# Patient Record
Sex: FEMALE | Race: BLACK OR AFRICAN AMERICAN | NOT HISPANIC OR LATINO | Employment: FULL TIME | URBAN - METROPOLITAN AREA
[De-identification: names, ages, dates, MRNs, and addresses within clinical notes are randomized per-mention and may not be internally consistent; named-entity substitution may affect disease eponyms.]

---

## 2018-02-06 ENCOUNTER — HOSPITAL ENCOUNTER (OUTPATIENT)
Dept: RADIOLOGY | Facility: OTHER | Age: 43
Discharge: HOME | End: 2018-02-06
Attending: OBSTETRICS & GYNECOLOGY
Payer: COMMERCIAL

## 2018-02-06 DIAGNOSIS — Z12.39 BREAST SCREENING: ICD-10-CM

## 2018-02-06 DIAGNOSIS — Z12.39 BREAST SCREENING: Primary | ICD-10-CM

## 2018-02-06 PROCEDURE — 77067 SCR MAMMO BI INCL CAD: CPT | Mod: 26,,, | Performed by: RADIOLOGY

## 2018-02-06 PROCEDURE — 77063 BREAST TOMOSYNTHESIS BI: CPT | Mod: 26,,, | Performed by: RADIOLOGY

## 2018-02-06 PROCEDURE — 77067 SCR MAMMO BI INCL CAD: CPT | Mod: TC

## 2018-02-07 ENCOUNTER — HOSPITAL ENCOUNTER (OUTPATIENT)
Facility: OTHER | Age: 43
Discharge: HOME | End: 2018-02-08
Attending: EMERGENCY MEDICINE | Admitting: HOSPITALIST
Payer: COMMERCIAL

## 2018-02-07 DIAGNOSIS — R10.9 INTRACTABLE ABDOMINAL PAIN: Primary | ICD-10-CM

## 2018-02-07 DIAGNOSIS — K52.9 GASTROENTERITIS, INFECTIOUS, PRESUMED: ICD-10-CM

## 2018-02-07 DIAGNOSIS — R10.84 GENERALIZED ABDOMINAL PAIN: ICD-10-CM

## 2018-02-07 DIAGNOSIS — K52.9 COLITIS: ICD-10-CM

## 2018-02-07 LAB
ALBUMIN SERPL BCP-MCNC: 4.3 G/DL
ALP SERPL-CCNC: 50 U/L
ALT SERPL W/O P-5'-P-CCNC: 13 U/L
ANION GAP SERPL CALC-SCNC: 9 MMOL/L
AST SERPL-CCNC: 13 U/L
BACTERIA #/AREA URNS HPF: ABNORMAL /HPF
BASOPHILS # BLD AUTO: 0 K/UL
BASOPHILS NFR BLD: 0 %
BILIRUB SERPL-MCNC: 0.5 MG/DL
BILIRUB UR QL STRIP: NEGATIVE
BUN SERPL-MCNC: 11 MG/DL
CALCIUM SERPL-MCNC: 9.3 MG/DL
CHLORIDE SERPL-SCNC: 107 MMOL/L
CLARITY UR: ABNORMAL
CO2 SERPL-SCNC: 25 MMOL/L
COLOR UR: YELLOW
CREAT SERPL-MCNC: 1.1 MG/DL
DIFFERENTIAL METHOD: ABNORMAL
EOSINOPHIL # BLD AUTO: 0 K/UL
EOSINOPHIL NFR BLD: 0.1 %
ERYTHROCYTE [DISTWIDTH] IN BLOOD BY AUTOMATED COUNT: 12.7 %
EST. GFR  (AFRICAN AMERICAN): >60 ML/MIN/1.73 M^2
EST. GFR  (NON AFRICAN AMERICAN): >60 ML/MIN/1.73 M^2
GLUCOSE SERPL-MCNC: 153 MG/DL
GLUCOSE UR QL STRIP: NEGATIVE
HCT VFR BLD AUTO: 35.6 %
HGB BLD-MCNC: 11.9 G/DL
HGB UR QL STRIP: ABNORMAL
HYALINE CASTS #/AREA URNS LPF: 0 /LPF
KETONES UR QL STRIP: ABNORMAL
LEUKOCYTE ESTERASE UR QL STRIP: NEGATIVE
LIPASE SERPL-CCNC: 32 U/L
LYMPHOCYTES # BLD AUTO: 1.4 K/UL
LYMPHOCYTES NFR BLD: 8.2 %
MCH RBC QN AUTO: 30.7 PG
MCHC RBC AUTO-ENTMCNC: 33.4 G/DL
MCV RBC AUTO: 92 FL
MICROSCOPIC COMMENT: ABNORMAL
MONOCYTES # BLD AUTO: 0.7 K/UL
MONOCYTES NFR BLD: 4.1 %
NEUTROPHILS # BLD AUTO: 14.5 K/UL
NEUTROPHILS NFR BLD: 87.3 %
NITRITE UR QL STRIP: NEGATIVE
PH UR STRIP: 6 [PH] (ref 5–8)
PLATELET # BLD AUTO: 222 K/UL
PMV BLD AUTO: 10.2 FL
POTASSIUM SERPL-SCNC: 3.8 MMOL/L
PROT SERPL-MCNC: 7.5 G/DL
PROT UR QL STRIP: ABNORMAL
RBC # BLD AUTO: 3.87 M/UL
RBC #/AREA URNS HPF: 4 /HPF (ref 0–4)
SODIUM SERPL-SCNC: 141 MMOL/L
SP GR UR STRIP: >=1.03 (ref 1–1.03)
SQUAMOUS #/AREA URNS HPF: 15 /HPF
URN SPEC COLLECT METH UR: ABNORMAL
UROBILINOGEN UR STRIP-ACNC: NEGATIVE EU/DL
WBC # BLD AUTO: 16.55 K/UL
WBC #/AREA URNS HPF: 0 /HPF (ref 0–5)

## 2018-02-07 PROCEDURE — 81000 URINALYSIS NONAUTO W/SCOPE: CPT

## 2018-02-07 PROCEDURE — 25500020 PHARM REV CODE 255: Performed by: EMERGENCY MEDICINE

## 2018-02-07 PROCEDURE — 96375 TX/PRO/DX INJ NEW DRUG ADDON: CPT

## 2018-02-07 PROCEDURE — 96366 THER/PROPH/DIAG IV INF ADDON: CPT

## 2018-02-07 PROCEDURE — 63600175 PHARM REV CODE 636 W HCPCS: Performed by: PHYSICIAN ASSISTANT

## 2018-02-07 PROCEDURE — 96361 HYDRATE IV INFUSION ADD-ON: CPT

## 2018-02-07 PROCEDURE — 96365 THER/PROPH/DIAG IV INF INIT: CPT

## 2018-02-07 PROCEDURE — 81025 URINE PREGNANCY TEST: CPT | Performed by: PHYSICIAN ASSISTANT

## 2018-02-07 PROCEDURE — 85025 COMPLETE CBC W/AUTO DIFF WBC: CPT

## 2018-02-07 PROCEDURE — 25000003 PHARM REV CODE 250: Performed by: PHYSICIAN ASSISTANT

## 2018-02-07 PROCEDURE — 96376 TX/PRO/DX INJ SAME DRUG ADON: CPT

## 2018-02-07 PROCEDURE — 83690 ASSAY OF LIPASE: CPT

## 2018-02-07 PROCEDURE — 99285 EMERGENCY DEPT VISIT HI MDM: CPT | Mod: 25

## 2018-02-07 PROCEDURE — 96367 TX/PROPH/DG ADDL SEQ IV INF: CPT

## 2018-02-07 PROCEDURE — 80053 COMPREHEN METABOLIC PANEL: CPT

## 2018-02-07 RX ORDER — ASPIRIN 81 MG/1
81 TABLET ORAL DAILY
COMMUNITY

## 2018-02-07 RX ORDER — FENTANYL CITRATE 50 UG/ML
25 INJECTION, SOLUTION INTRAMUSCULAR; INTRAVENOUS
Status: COMPLETED | OUTPATIENT
Start: 2018-02-07 | End: 2018-02-07

## 2018-02-07 RX ORDER — HYOSCYAMINE SULFATE 0.5 MG/ML
0.5 INJECTION, SOLUTION SUBCUTANEOUS
Status: COMPLETED | OUTPATIENT
Start: 2018-02-07 | End: 2018-02-07

## 2018-02-07 RX ORDER — SODIUM CHLORIDE 9 MG/ML
1000 INJECTION, SOLUTION INTRAVENOUS
Status: COMPLETED | OUTPATIENT
Start: 2018-02-07 | End: 2018-02-07

## 2018-02-07 RX ORDER — SODIUM CHLORIDE 9 MG/ML
1000 INJECTION, SOLUTION INTRAVENOUS
Status: COMPLETED | OUTPATIENT
Start: 2018-02-07 | End: 2018-02-08

## 2018-02-07 RX ORDER — KETOROLAC TROMETHAMINE 30 MG/ML
15 INJECTION, SOLUTION INTRAMUSCULAR; INTRAVENOUS
Status: COMPLETED | OUTPATIENT
Start: 2018-02-08 | End: 2018-02-08

## 2018-02-07 RX ORDER — OXYCODONE AND ACETAMINOPHEN 5; 325 MG/1; MG/1
2 TABLET ORAL
Status: COMPLETED | OUTPATIENT
Start: 2018-02-07 | End: 2018-02-07

## 2018-02-07 RX ORDER — FENTANYL CITRATE 50 UG/ML
50 INJECTION, SOLUTION INTRAMUSCULAR; INTRAVENOUS
Status: COMPLETED | OUTPATIENT
Start: 2018-02-07 | End: 2018-02-07

## 2018-02-07 RX ORDER — FENTANYL CITRATE 50 UG/ML
25 INJECTION, SOLUTION INTRAMUSCULAR; INTRAVENOUS
Status: COMPLETED | OUTPATIENT
Start: 2018-02-08 | End: 2018-02-08

## 2018-02-07 RX ORDER — ONDANSETRON 4 MG/1
4 TABLET, ORALLY DISINTEGRATING ORAL
Status: COMPLETED | OUTPATIENT
Start: 2018-02-07 | End: 2018-02-07

## 2018-02-07 RX ORDER — ONDANSETRON 2 MG/ML
4 INJECTION INTRAMUSCULAR; INTRAVENOUS
Status: COMPLETED | OUTPATIENT
Start: 2018-02-07 | End: 2018-02-07

## 2018-02-07 RX ADMIN — ONDANSETRON 4 MG: 4 TABLET, ORALLY DISINTEGRATING ORAL at 08:02

## 2018-02-07 RX ADMIN — IOHEXOL 75 ML: 350 INJECTION, SOLUTION INTRAVENOUS at 10:02

## 2018-02-07 RX ADMIN — FENTANYL CITRATE 50 MCG: 50 INJECTION, SOLUTION INTRAMUSCULAR; INTRAVENOUS at 10:02

## 2018-02-07 RX ADMIN — OXYCODONE HYDROCHLORIDE AND ACETAMINOPHEN 2 TABLET: 5; 325 TABLET ORAL at 09:02

## 2018-02-07 RX ADMIN — HYOSCYAMINE SULFATE 0.5 MG: 0.5 INJECTION, SOLUTION SUBCUTANEOUS at 11:02

## 2018-02-07 RX ADMIN — SODIUM CHLORIDE 1000 ML: 0.9 INJECTION, SOLUTION INTRAVENOUS at 09:02

## 2018-02-07 RX ADMIN — ONDANSETRON 4 MG: 2 INJECTION, SOLUTION INTRAMUSCULAR; INTRAVENOUS at 10:02

## 2018-02-07 RX ADMIN — FENTANYL CITRATE 25 MCG: 50 INJECTION, SOLUTION INTRAMUSCULAR; INTRAVENOUS at 11:02

## 2018-02-07 RX ADMIN — SODIUM CHLORIDE 1000 ML: 0.9 INJECTION, SOLUTION INTRAVENOUS at 11:02

## 2018-02-08 ENCOUNTER — TELEPHONE (OUTPATIENT)
Dept: RADIOLOGY | Facility: OTHER | Age: 43
End: 2018-02-08

## 2018-02-08 VITALS
HEIGHT: 58 IN | WEIGHT: 105 LBS | OXYGEN SATURATION: 100 % | HEART RATE: 78 BPM | TEMPERATURE: 99 F | RESPIRATION RATE: 18 BRPM | SYSTOLIC BLOOD PRESSURE: 118 MMHG | BODY MASS INDEX: 22.04 KG/M2 | DIASTOLIC BLOOD PRESSURE: 58 MMHG

## 2018-02-08 PROBLEM — R10.9 INTRACTABLE ABDOMINAL PAIN: Status: ACTIVE | Noted: 2018-02-08

## 2018-02-08 PROBLEM — K52.9 GASTROENTERITIS, INFECTIOUS, PRESUMED: Status: ACTIVE | Noted: 2018-02-08

## 2018-02-08 LAB
ALBUMIN SERPL BCP-MCNC: 3.2 G/DL
ALP SERPL-CCNC: 37 U/L
ALT SERPL W/O P-5'-P-CCNC: 11 U/L
ANION GAP SERPL CALC-SCNC: 5 MMOL/L
AST SERPL-CCNC: 13 U/L
B-HCG UR QL: NEGATIVE
BASOPHILS # BLD AUTO: 0 K/UL
BASOPHILS NFR BLD: 0 %
BILIRUB SERPL-MCNC: 0.5 MG/DL
BUN SERPL-MCNC: 7 MG/DL
CALCIUM SERPL-MCNC: 8 MG/DL
CHLORIDE SERPL-SCNC: 111 MMOL/L
CO2 SERPL-SCNC: 22 MMOL/L
CREAT SERPL-MCNC: 0.8 MG/DL
CTP QC/QA: YES
DIFFERENTIAL METHOD: ABNORMAL
EOSINOPHIL # BLD AUTO: 0 K/UL
EOSINOPHIL NFR BLD: 0 %
ERYTHROCYTE [DISTWIDTH] IN BLOOD BY AUTOMATED COUNT: 12.9 %
EST. GFR  (AFRICAN AMERICAN): >60 ML/MIN/1.73 M^2
EST. GFR  (NON AFRICAN AMERICAN): >60 ML/MIN/1.73 M^2
GLUCOSE SERPL-MCNC: 147 MG/DL
HCT VFR BLD AUTO: 28.7 %
HGB BLD-MCNC: 9.4 G/DL
LYMPHOCYTES # BLD AUTO: 0.9 K/UL
LYMPHOCYTES NFR BLD: 13.2 %
MAGNESIUM SERPL-MCNC: 1.7 MG/DL
MCH RBC QN AUTO: 30 PG
MCHC RBC AUTO-ENTMCNC: 32.8 G/DL
MCV RBC AUTO: 92 FL
MONOCYTES # BLD AUTO: 0.3 K/UL
MONOCYTES NFR BLD: 4.8 %
NEUTROPHILS # BLD AUTO: 5.8 K/UL
NEUTROPHILS NFR BLD: 81.9 %
PHOSPHATE SERPL-MCNC: 3.2 MG/DL
PLATELET # BLD AUTO: 169 K/UL
PMV BLD AUTO: 10.6 FL
POTASSIUM SERPL-SCNC: 3.7 MMOL/L
PROT SERPL-MCNC: 5.7 G/DL
RBC # BLD AUTO: 3.13 M/UL
SODIUM SERPL-SCNC: 138 MMOL/L
WBC # BLD AUTO: 7.11 K/UL

## 2018-02-08 PROCEDURE — S0030 INJECTION, METRONIDAZOLE: HCPCS | Performed by: NURSE PRACTITIONER

## 2018-02-08 PROCEDURE — 80053 COMPREHEN METABOLIC PANEL: CPT

## 2018-02-08 PROCEDURE — 63600175 PHARM REV CODE 636 W HCPCS: Performed by: NURSE PRACTITIONER

## 2018-02-08 PROCEDURE — 83735 ASSAY OF MAGNESIUM: CPT

## 2018-02-08 PROCEDURE — 25000003 PHARM REV CODE 250: Performed by: NURSE PRACTITIONER

## 2018-02-08 PROCEDURE — 99217 PR OBSERVATION CARE DISCHARGE: CPT | Mod: ,,, | Performed by: HOSPITALIST

## 2018-02-08 PROCEDURE — 87086 URINE CULTURE/COLONY COUNT: CPT

## 2018-02-08 PROCEDURE — 99220 PR INITIAL OBSERVATION CARE,LEVL III: CPT | Mod: SA,,, | Performed by: NURSE PRACTITIONER

## 2018-02-08 PROCEDURE — 63600175 PHARM REV CODE 636 W HCPCS: Performed by: PHYSICIAN ASSISTANT

## 2018-02-08 PROCEDURE — 84100 ASSAY OF PHOSPHORUS: CPT

## 2018-02-08 PROCEDURE — G0378 HOSPITAL OBSERVATION PER HR: HCPCS

## 2018-02-08 PROCEDURE — 85025 COMPLETE CBC W/AUTO DIFF WBC: CPT

## 2018-02-08 RX ORDER — ASPIRIN 81 MG/1
81 TABLET ORAL DAILY
Status: DISCONTINUED | OUTPATIENT
Start: 2018-02-08 | End: 2018-02-08 | Stop reason: HOSPADM

## 2018-02-08 RX ORDER — SODIUM CHLORIDE 0.9 % (FLUSH) 0.9 %
5 SYRINGE (ML) INJECTION
Status: DISCONTINUED | OUTPATIENT
Start: 2018-02-08 | End: 2018-02-08 | Stop reason: HOSPADM

## 2018-02-08 RX ORDER — FENTANYL CITRATE 50 UG/ML
25 INJECTION, SOLUTION INTRAMUSCULAR; INTRAVENOUS EVERY 4 HOURS PRN
Status: DISCONTINUED | OUTPATIENT
Start: 2018-02-08 | End: 2018-02-08

## 2018-02-08 RX ORDER — ACETAMINOPHEN 325 MG/1
650 TABLET ORAL EVERY 4 HOURS PRN
Status: DISCONTINUED | OUTPATIENT
Start: 2018-02-08 | End: 2018-02-08 | Stop reason: HOSPADM

## 2018-02-08 RX ORDER — ENOXAPARIN SODIUM 100 MG/ML
40 INJECTION SUBCUTANEOUS EVERY 24 HOURS
Status: DISCONTINUED | OUTPATIENT
Start: 2018-02-08 | End: 2018-02-08 | Stop reason: HOSPADM

## 2018-02-08 RX ORDER — METRONIDAZOLE 500 MG/100ML
500 INJECTION, SOLUTION INTRAVENOUS
Status: DISCONTINUED | OUTPATIENT
Start: 2018-02-08 | End: 2018-02-08 | Stop reason: HOSPADM

## 2018-02-08 RX ORDER — HYDROMORPHONE HYDROCHLORIDE 1 MG/ML
0.5 INJECTION, SOLUTION INTRAMUSCULAR; INTRAVENOUS; SUBCUTANEOUS EVERY 6 HOURS PRN
Status: DISCONTINUED | OUTPATIENT
Start: 2018-02-08 | End: 2018-02-08 | Stop reason: HOSPADM

## 2018-02-08 RX ORDER — HYDROCODONE BITARTRATE AND ACETAMINOPHEN 7.5; 325 MG/1; MG/1
1 TABLET ORAL EVERY 6 HOURS PRN
Qty: 15 TABLET | Refills: 0 | Status: SHIPPED | OUTPATIENT
Start: 2018-02-08

## 2018-02-08 RX ORDER — DEXTROSE MONOHYDRATE AND SODIUM CHLORIDE 5; .9 G/100ML; G/100ML
INJECTION, SOLUTION INTRAVENOUS CONTINUOUS
Status: DISCONTINUED | OUTPATIENT
Start: 2018-02-08 | End: 2018-02-08 | Stop reason: HOSPADM

## 2018-02-08 RX ORDER — CIPROFLOXACIN 2 MG/ML
400 INJECTION, SOLUTION INTRAVENOUS
Status: DISCONTINUED | OUTPATIENT
Start: 2018-02-08 | End: 2018-02-08 | Stop reason: HOSPADM

## 2018-02-08 RX ORDER — PROMETHAZINE HYDROCHLORIDE 12.5 MG/1
12.5 TABLET ORAL EVERY 6 HOURS PRN
Qty: 15 TABLET | Refills: 0 | Status: SHIPPED | OUTPATIENT
Start: 2018-02-08

## 2018-02-08 RX ORDER — ONDANSETRON 2 MG/ML
4 INJECTION INTRAMUSCULAR; INTRAVENOUS EVERY 8 HOURS PRN
Status: DISCONTINUED | OUTPATIENT
Start: 2018-02-08 | End: 2018-02-08 | Stop reason: HOSPADM

## 2018-02-08 RX ADMIN — ASPIRIN 81 MG: 81 TABLET, COATED ORAL at 08:02

## 2018-02-08 RX ADMIN — LORAZEPAM 1 MG: 2 INJECTION INTRAMUSCULAR; INTRAVENOUS at 12:02

## 2018-02-08 RX ADMIN — METRONIDAZOLE 500 MG: 500 INJECTION, SOLUTION INTRAVENOUS at 03:02

## 2018-02-08 RX ADMIN — FENTANYL CITRATE 25 MCG: 50 INJECTION INTRAMUSCULAR; INTRAVENOUS at 12:02

## 2018-02-08 RX ADMIN — KETOROLAC TROMETHAMINE 15 MG: 30 INJECTION, SOLUTION INTRAMUSCULAR at 12:02

## 2018-02-08 RX ADMIN — DEXTROSE AND SODIUM CHLORIDE: 5; .9 INJECTION, SOLUTION INTRAVENOUS at 04:02

## 2018-02-08 RX ADMIN — CIPROFLOXACIN 400 MG: 2 INJECTION, SOLUTION INTRAVENOUS at 04:02

## 2018-02-08 RX ADMIN — HYDROMORPHONE HYDROCHLORIDE 0.5 MG: 1 INJECTION, SOLUTION INTRAMUSCULAR; INTRAVENOUS; SUBCUTANEOUS at 04:02

## 2018-02-08 RX ADMIN — METRONIDAZOLE 500 MG: 500 INJECTION, SOLUTION INTRAVENOUS at 11:02

## 2018-02-08 NOTE — H&P
"[facility] Hospital Medicine  History & Physical    Patient Name: Maribell Starks  MRN: [id number]  Admission Date: 2/7/2018  Attending Physician: No att. providers found   Primary Care Provider: Jessica Lama MD         Patient information was obtained from patient, relative(s) and ER records.     Subjective:     Principal Problem:Intractable abdominal pain    Chief Complaint:   Chief Complaint   Patient presents with    Abdominal Pain     All over abdominal pain that started all of a sudden, it subsided, she ate and then pain came back "doubled or tripled."  Just came from the bathroom.  States was able to urinate and defecate (but it feels like constipation) only a little came out.  Took 2 Advil PTA without relief        HPI: Patient is 42-year-old female no past medical history presents with complaints of sudden onset of sharp shooting abdominal pain approximately 2 hours prior to arrival.  Patient reports she was in her car with her wife headed to a restaurant to eat when she felt sudden onset of abdominal pain.  She reports symptoms lasted for approximately 10-15 minutes significantly improved to a dull ache.  Patient admits she thought it was hunger pain so she went into the restaurant and ate combo, fried fish and drank Sprite.  Upon returning back to the car after dinner she had sudden onset of the same pain that has now been constant.  She initially presented to an urgent care setting where she was referred to the emergency department for further evaluation because of her acute presentation of pain.  She reports associated nausea at that time with no vomiting.  She does admit however she started vomiting when she came into this emergency department.  She reports normal bowel movement today with no fever, chills, dysuria, hematuria, irregular vaginal bleeding, vaginal discharge, trauma or injury.  She is currently accompanied by her a flu is at bedside.    History reviewed. No pertinent " past medical history.    History reviewed. No pertinent surgical history.    Review of patient's allergies indicates:   Allergen Reactions    Penicillins Other (See Comments)     Unsure, mom has told her she was allergic       No current facility-administered medications on file prior to encounter.      No current outpatient prescriptions on file prior to encounter.     Family History     Problem Relation (Age of Onset)    Breast cancer Paternal Aunt        Social History Main Topics    Smoking status: Never Smoker    Smokeless tobacco: Never Used    Alcohol use No    Drug use: No    Sexual activity: Not on file     Review of Systems   Constitutional: Positive for activity change, appetite change and fatigue. Negative for fever.   HENT: Negative for congestion, ear pain, rhinorrhea and sinus pressure.    Eyes: Negative for pain and discharge.   Respiratory: Negative for cough, chest tightness, shortness of breath and wheezing.    Cardiovascular: Negative for chest pain and leg swelling.   Gastrointestinal: Positive for abdominal distention, abdominal pain, nausea and vomiting. Negative for diarrhea.   Endocrine: Negative for cold intolerance and heat intolerance.   Genitourinary: Negative for difficulty urinating, flank pain, frequency, hematuria and urgency.   Musculoskeletal: Negative for arthralgias, joint swelling and myalgias.   Allergic/Immunologic: Negative for environmental allergies and food allergies.   Neurological: Negative for dizziness, weakness, light-headedness and headaches.   Hematological: Does not bruise/bleed easily.   Psychiatric/Behavioral: Negative for agitation, behavioral problems and decreased concentration.     Objective:     Vital Signs (Most Recent):  Temp: 97.8 °F (36.6 °C) (02/07/18 2123)  Pulse: 96 (02/08/18 0136)  Resp: 17 (02/07/18 1933)  BP: 113/65 (02/08/18 0136)  SpO2: 100 % (02/08/18 0136) Vital Signs (24h Range):  Temp:  [97.7 °F (36.5 °C)-97.8 °F (36.6 °C)] 97.8 °F  (36.6 °C)  Pulse:  [] 96  Resp:  [17] 17  SpO2:  [97 %-100 %] 100 %  BP: (105-124)/(52-70) 113/65     Weight: 47.6 kg (105 lb)  Body mass index is 21.57 kg/m².    Physical Exam   Constitutional: She is oriented to person, place, and time. She appears well-developed and well-nourished.   HENT:   Head: Normocephalic.   Eyes: Conjunctivae are normal. Right eye exhibits no discharge. Left eye exhibits no discharge.   Neck: Normal range of motion. Neck supple.   Cardiovascular: Normal rate, regular rhythm, normal heart sounds and intact distal pulses.    Pulmonary/Chest: Effort normal and breath sounds normal. No respiratory distress.   Abdominal: Soft. Bowel sounds are normal. She exhibits distension. There is generalized tenderness.   Musculoskeletal: Normal range of motion.   Neurological: She is alert and oriented to person, place, and time. GCS eye subscore is 4. GCS verbal subscore is 5. GCS motor subscore is 6.   Skin: Skin is warm and dry.   Psychiatric: Thought content normal. Her mood appears anxious. Her speech is rapid and/or pressured. She is agitated.           Significant Labs:   CBC:   Recent Labs  Lab 02/07/18  2102   WBC 16.55*   HGB 11.9*   HCT 35.6*        CMP:   Recent Labs  Lab 02/07/18  2102      K 3.8      CO2 25   *   BUN 11   CREATININE 1.1   CALCIUM 9.3   PROT 7.5   ALBUMIN 4.3   BILITOT 0.5   ALKPHOS 50*   AST 13   ALT 13   ANIONGAP 9   EGFRNONAA >60       Significant Imaging: I have reviewed all pertinent imaging results/findings within the past 24 hours.    Assessment/Plan:     * Intractable abdominal pain    CT- No CT evidence of acute appendicitis.  Wall thickening involving the large bowel.  The findings are suggestive of colitis.  Poor definition of the uterus with multiple uterine fibroids suspected.  Outpatient pelvic ultrasound and OB/GYN followup is suggested.  Nonspecific small amount of free fluid in the pelvis.    US- Heterogeneous appearance of the  uterus with at least 3 intramural fibroids, noting that evaluation is limited in the absence of transvaginal imaging. Recommend correlation with clinical findings and continued followup as indicated    Cipro/Flagyl for colitis  Dilaudid for pain  Zofran for N/V            VTE Risk Mitigation         Ordered     enoxaparin injection 40 mg  Daily     Route:  Subcutaneous        02/08/18 0254     Medium Risk of VTE  Once      02/08/18 0254     Place sequential compression device  Until discontinued      02/08/18 0138     Place CARLY hose  Until discontinued      02/08/18 0138             Farzad Engle NP  Department of Hospital Medicine   [facility]

## 2018-02-08 NOTE — SUBJECTIVE & OBJECTIVE
History reviewed. No pertinent past medical history.    History reviewed. No pertinent surgical history.    Review of patient's allergies indicates:   Allergen Reactions    Penicillins Other (See Comments)     Unsure, mom has told her she was allergic       No current facility-administered medications on file prior to encounter.      No current outpatient prescriptions on file prior to encounter.     Family History     Problem Relation (Age of Onset)    Breast cancer Paternal Aunt        Social History Main Topics    Smoking status: Never Smoker    Smokeless tobacco: Never Used    Alcohol use No    Drug use: No    Sexual activity: Not on file     Review of Systems   Constitutional: Positive for activity change, appetite change and fatigue. Negative for fever.   HENT: Negative for congestion, ear pain, rhinorrhea and sinus pressure.    Eyes: Negative for pain and discharge.   Respiratory: Negative for cough, chest tightness, shortness of breath and wheezing.    Cardiovascular: Negative for chest pain and leg swelling.   Gastrointestinal: Positive for abdominal distention, abdominal pain, nausea and vomiting. Negative for diarrhea.   Endocrine: Negative for cold intolerance and heat intolerance.   Genitourinary: Negative for difficulty urinating, flank pain, frequency, hematuria and urgency.   Musculoskeletal: Negative for arthralgias, joint swelling and myalgias.   Allergic/Immunologic: Negative for environmental allergies and food allergies.   Neurological: Negative for dizziness, weakness, light-headedness and headaches.   Hematological: Does not bruise/bleed easily.   Psychiatric/Behavioral: Negative for agitation, behavioral problems and decreased concentration.     Objective:     Vital Signs (Most Recent):  Temp: 97.8 °F (36.6 °C) (02/07/18 2123)  Pulse: 96 (02/08/18 0136)  Resp: 17 (02/07/18 1933)  BP: 113/65 (02/08/18 0136)  SpO2: 100 % (02/08/18 0136) Vital Signs (24h Range):  Temp:  [97.7 °F (36.5  °C)-97.8 °F (36.6 °C)] 97.8 °F (36.6 °C)  Pulse:  [] 96  Resp:  [17] 17  SpO2:  [97 %-100 %] 100 %  BP: (105-124)/(52-70) 113/65     Weight: 47.6 kg (105 lb)  Body mass index is 21.57 kg/m².    Physical Exam   Constitutional: She is oriented to person, place, and time. She appears well-developed and well-nourished.   HENT:   Head: Normocephalic.   Eyes: Conjunctivae are normal. Right eye exhibits no discharge. Left eye exhibits no discharge.   Neck: Normal range of motion. Neck supple.   Cardiovascular: Normal rate, regular rhythm, normal heart sounds and intact distal pulses.    Pulmonary/Chest: Effort normal and breath sounds normal. No respiratory distress.   Abdominal: Soft. Bowel sounds are normal. She exhibits distension. There is generalized tenderness.   Musculoskeletal: Normal range of motion.   Neurological: She is alert and oriented to person, place, and time. GCS eye subscore is 4. GCS verbal subscore is 5. GCS motor subscore is 6.   Skin: Skin is warm and dry.   Psychiatric: Thought content normal. Her mood appears anxious. Her speech is rapid and/or pressured. She is agitated.           Significant Labs:   CBC:   Recent Labs  Lab 02/07/18  2102   WBC 16.55*   HGB 11.9*   HCT 35.6*        CMP:   Recent Labs  Lab 02/07/18  2102      K 3.8      CO2 25   *   BUN 11   CREATININE 1.1   CALCIUM 9.3   PROT 7.5   ALBUMIN 4.3   BILITOT 0.5   ALKPHOS 50*   AST 13   ALT 13   ANIONGAP 9   EGFRNONAA >60       Significant Imaging: I have reviewed all pertinent imaging results/findings within the past 24 hours.

## 2018-02-08 NOTE — DISCHARGE SUMMARY
[facility] Hospital Medicine  Discharge Summary      Patient Name: Maribell Starks  MRN: [id number]  Admission Date: 2/7/2018  Hospital Length of Stay: 0 days  Discharge Date and Time:  02/08/2018 2:35 PM  Attending Physician: Trupti Doss MD   Discharging Provider: Trupti Doss MD  Primary Care Provider: Jessica Lama MD      HPI:   Patient is 42-year-old female no past medical history presents with complaints of sudden onset of sharp shooting abdominal pain approximately 2 hours prior to arrival.  Patient reports she was in her car with her wife headed to a restaurant to eat when she felt sudden onset of abdominal pain.  She reports symptoms lasted for approximately 10-15 minutes significantly improved to a dull ache.  Patient admits she thought it was hunger pain so she went into the restaurant and ate combo, fried fish and drank Sprite.  Upon returning back to the car after dinner she had sudden onset of the same pain that has now been constant.  She initially presented to an urgent care setting where she was referred to the emergency department for further evaluation because of her acute presentation of pain.  She reports associated nausea at that time with no vomiting.  She does admit however she started vomiting when she came into this emergency department.  She reports normal bowel movement today with no fever, chills, dysuria, hematuria, irregular vaginal bleeding, vaginal discharge, trauma or injury.  She is currently accompanied by her a flu is at bedside.    * No surgery found *      Hospital Course:   The patient had a CT Scan of the Abdomen and Pelvis done that was unremarkable.  Abdominal ultrasound was also unremarkable.  This was followed by a pelvic ultrasound that revealed 3 intramural fibroids otherwise no significant findings.  The patient responded well to pain control and was able to tolerate a diet prior to discharge.  This episode may represent a viral gastroenteritis  and this was discussed with the patient.  She is instructed to follow up with her PCP to ensure complete resolution of her symptoms.     Consults:     No new Assessment & Plan notes have been filed under this hospital service since the last note was generated.  Service: Hospital Medicine    Final Active Diagnoses:    Diagnosis Date Noted POA    PRINCIPAL PROBLEM:  Intractable abdominal pain [R10.9] 02/08/2018 Yes    Gastroenteritis, infectious, presumed [A09] 02/08/2018 Yes      Problems Resolved During this Admission:    Diagnosis Date Noted Date Resolved POA       Discharged Condition: good    Disposition: Home or Self Care    Follow Up:  Follow-up Information     Jessica Lama MD In 1 week.    Specialties:  Obstetrics, Obstetrics and Gynecology  Contact information:  [facility] [facility]  [address]  [facility] [id number] [phone]                 Patient Instructions:     Diet Adult Regular     Activity as tolerated         Significant Diagnostic Studies: Labs:   CMP   Recent Labs  Lab 02/07/18  2102 02/08/18  0558    138   K 3.8 3.7    111*   CO2 25 22*   * 147*   BUN 11 7   CREATININE 1.1 0.8   CALCIUM 9.3 8.0*   PROT 7.5 5.7*   ALBUMIN 4.3 3.2*   BILITOT 0.5 0.5   ALKPHOS 50* 37*   AST 13 13   ALT 13 11   ANIONGAP 9 5*   ESTGFRAFRICA >60 >60   EGFRNONAA >60 >60    and CBC   Recent Labs  Lab 02/07/18  2102 02/08/18  0558   WBC 16.55* 7.11   HGB 11.9* 9.4*   HCT 35.6* 28.7*    169       Pending Diagnostic Studies:     None         Medications:  Reconciled Home Medications:   Current Discharge Medication List      START taking these medications    Details   hydrocodone-acetaminophen 7.5-325mg (NORCO) 7.5-325 mg per tablet Take 1 tablet by mouth every 6 (six) hours as needed for Pain.  Qty: 15 tablet, Refills: 0      promethazine (PHENERGAN) 12.5 MG Tab Take 1 tablet (12.5 mg total) by mouth every 6 (six) hours as needed.  Qty: 15 tablet, Refills: 0         CONTINUE these medications  which have NOT CHANGED    Details   aspirin (ECOTRIN) 81 MG EC tablet Take 81 mg by mouth once daily.             Indwelling Lines/Drains at time of discharge:   Lines/Drains/Airways          No matching active lines, drains, or airways          Time spent on the discharge of patient: 20 minutes  Patient was seen and examined on the date of discharge and determined to be suitable for discharge.         Trupti Doss MD  Department of Hospital Medicine  [facility]

## 2018-02-08 NOTE — ED PROVIDER NOTES
"Encounter Date: 2/7/2018       History     Chief Complaint   Patient presents with    Abdominal Pain     All over abdominal pain that started all of a sudden, it subsided, she ate and then pain came back "doubled or tripled."  Just came from the bathroom.  States was able to urinate and defecate (but it feels like constipation) only a little came out.  Took 2 Advil PTA without relief     Patient is 42-year-old female no past medical history presents with complaints of sudden onset of sharp shooting abdominal pain approximately 2 hours prior to arrival.  Patient reports she was in her car with her wife headed to a restaurant to eat when she felt sudden onset of abdominal pain.  She reports symptoms lasted for approximately 10-15 minutes significantly improved to a dull ache.  Patient admits she thought it was hunger pain so she went into the restaurant and ate combo, fried fish and drank Sprite.  Upon returning back to the car after dinner she had sudden onset of the same pain that has now been constant.  She initially presented to an urgent care setting where she was referred to the emergency department for further evaluation because of her acute presentation of pain.  She reports associated nausea at that time with no vomiting.  She does admit however she started vomiting when she came into this emergency department.  She reports normal bowel movement today with no fever, chills, dysuria, hematuria, irregular vaginal bleeding, vaginal discharge, trauma or injury.  She is currently accompanied by her a flu is at bedside.          Review of patient's allergies indicates:   Allergen Reactions    Penicillins Other (See Comments)     Unsure, mom has told her she was allergic     History reviewed. No pertinent past medical history.  History reviewed. No pertinent surgical history.  Family History   Problem Relation Age of Onset    Breast cancer Paternal Aunt      Social History   Substance Use Topics    Smoking " status: Never Smoker    Smokeless tobacco: Never Used    Alcohol use No     Review of Systems   Constitutional: Negative for fever.   HENT: Negative for sore throat.    Respiratory: Negative for shortness of breath.    Cardiovascular: Negative for chest pain.   Gastrointestinal: Positive for abdominal pain, nausea and vomiting.   Genitourinary: Negative for dysuria.   Musculoskeletal: Negative for back pain.   Skin: Negative for rash.   Neurological: Negative for weakness.   Hematological: Does not bruise/bleed easily.       Physical Exam     Initial Vitals [02/07/18 1933]   BP Pulse Resp Temp SpO2   (!) 110/57 70 17 97.7 °F (36.5 °C) 99 %      MAP       74.67         Physical Exam    Nursing note and vitals reviewed.  Constitutional: She appears well-developed and well-nourished. She is not diaphoretic. No distress.   Healthy appearing female in no acute distress but does appear to be uncomfortable during interview and exam.  She makes good eye contact, speaks in clear full sentences and ambulates with ease.   HENT:   Head: Normocephalic and atraumatic.   Eyes: Conjunctivae and EOM are normal. Pupils are equal, round, and reactive to light. Right eye exhibits no discharge. Left eye exhibits no discharge. No scleral icterus.   Neck: Normal range of motion.   Cardiovascular: Normal rate, regular rhythm and normal heart sounds. Exam reveals no gallop and no friction rub.    No murmur heard.  Pulmonary/Chest: Breath sounds normal. She has no wheezes. She has no rhonchi. She has no rales.   Abdominal: Soft. Bowel sounds are normal. There is no tenderness. There is no rebound and no guarding.   Generalized abdominal tenderness to palpation with guarding.  Negative pronator's, heeltap.  Normal bowel sounds in all quadrants.  No CVA tenderness to percussion.   Musculoskeletal: Normal range of motion. She exhibits no edema or tenderness.   Lymphadenopathy:     She has no cervical adenopathy.   Neurological: She is alert  and oriented to person, place, and time. She has normal strength. No cranial nerve deficit or sensory deficit.   Skin: Skin is warm. Capillary refill takes less than 2 seconds. No rash and no abscess noted. No erythema.   Psychiatric: She has a normal mood and affect. Her behavior is normal. Thought content normal.         ED Course   Procedures  Labs Reviewed - No data to display      Labs Reviewed   CBC W/ AUTO DIFFERENTIAL - Abnormal; Notable for the following:        Result Value    WBC 16.55 (*)     RBC 3.87 (*)     Hemoglobin 11.9 (*)     Hematocrit 35.6 (*)     Gran # (ANC) 14.5 (*)     Gran% 87.3 (*)     Lymph% 8.2 (*)     All other components within normal limits   COMPREHENSIVE METABOLIC PANEL - Abnormal; Notable for the following:     Glucose 153 (*)     Alkaline Phosphatase 50 (*)     All other components within normal limits   URINALYSIS - Abnormal; Notable for the following:     Appearance, UA Hazy (*)     Specific Gravity, UA >=1.030 (*)     Protein, UA 1+ (*)     Ketones, UA 1+ (*)     Occult Blood UA 2+ (*)     All other components within normal limits   URINALYSIS MICROSCOPIC - Abnormal; Notable for the following:     Bacteria, UA Many (*)     All other components within normal limits   LIPASE   POCT URINE PREGNANCY     Imaging Results          CT Abdomen Pelvis With Contrast (Final result)  Result time 02/07/18 23:29:23    Final result by Noe Gupta MD (02/07/18 23:29:23)                 Impression:       1. No CT evidence of acute appendicitis.    2.  Wall thickening involving the large bowel.  The findings are suggestive of colitis.    3. Poor definition of the uterus with multiple uterine fibroids suspected.  Outpatient pelvic ultrasound and OB/GYN followup is suggested.    4.  Nonspecific small amount of free fluid in the pelvis.              Electronically signed by: NOE GUPTA MD  Date:     02/07/18  Time:    23:29              Narrative:    Exam: [id number]  02/07/18  21:47:15 [id number] ([facility])  : CT ABDOMEN PELVIS WITH CONTRAST    Technique:    Axial CT Scan of the abdomen and pelvis was performed from the lung base to the public symphysis after the intravenous administration of  75 cc of Omni 350. Coronal and Sagittal reformats were obtained. Delayed images were also obtained    Comparison:     None     Findings:      No pleural effusions are present.  There is no evidence of pneumothorax.  No airspace opacities.    The heart is unremarkable.  The vessels are unremarkable.  There is mild nonspecific periportal edema.  There is no evidence of vascular occlusion.  There is no evidence of lymphadenopathy in the abdomen or pelvis.    The esophagus is unremarkable.  There is mild distention of the stomach.  The duodenum is within normal limits.  There is mild prominence of the small bowel loops.  No definite transition point is identified.  The visualized portions of the appendix appear within normal limits.  The appendix is not visualized in its entirety.  There is large amount of stool within the large bowel.  There is diffuse wall thickening involving the large bowel.  No evidence of bowel obstruction is identified.    The liver, gallbladder, and biliary system are within normal limits.  The spleen, pancreas, and adrenal glands are within normal limits.    The kidneys, ureters, and urinary bladder are within normal limits.  There is poor definition of the uterus with multiple low attenuation lesions within the uterus.  The adnexal structures appear grossly unremarkable.    There is small amount of free fluid in the pelvis.  There is no evidence of free air.  There is no evidence of pneumatosis.    The psoas margins are unremarkable.  The abdominal wall is within normal limits.  The osseous structures are unremarkable.  A calcified disc is noted at the S1-S2 level.                                   Medical Decision Making:   Differential Diagnosis:   Perforated gastric ulcer, ruptured ovarian cyst, ectopic  pregnancy, ruptured appendicitis, acute cholecystitis, ischemic bowel, nephrolithiasis, colitis, ovarian torsion  ED Management:  Urgent evaluation a 42-year-old female who presents with complaints of abdominal pain without clear etiology at this time.  She is afebrile, nontoxic appearing, hemodynamically stable.  Physical exam outlined above and reveals generalized abdominal tenderness to palpation with overall guarding.  No specific peritoneal signs.  Diagnostic labs and analgesia pending.  CT abdomen pelvis will be obtained with IV contrast.  We'll continue to monitor.  11:00 PM Discussed case with Radiologist who reports he is looking at images now, he will let me know as soon as read is complete. Patient still with pain refractory to IV fentanyl. Still with stable vital signs.     Update: CT reveals evidence of colitis and uterine fibroids with no evidence of acute surgical abdomen including appendicitis, abscess or perforation.  No surgical consult felt warranted. We'll obtain transvaginal ultrasound in consideration for acute ovarian pathology.  Diagnostic labs reveal  nonspecific leukocytosis at 16,000 with nonacute anemia and no other electrolyte abnormalities.  No evidence of UTI though many bacteria presents, will send urine culture.  Patient has persistent generalized abdominal pain that is ultimately managed with multiple rounds of fentanyl, Toradol, Bentyl and Ativan.  We will admit for serial abdominal exams and pain control.       Discussed case with guy who is amenable to admission plan.  Patient and her family made aware and are amenable.    Other:   I have discussed this case with another health care provider.       <> Summary of the Discussion: Anamaria Engle                   ED Course      Clinical Impression:   The primary encounter diagnosis was Intractable abdominal pain. Diagnoses of Generalized abdominal pain and Colitis were also pertinent to this visit.                            Nita Lindsey PA-C  02/08/18 0141       Nita Lindsey PA-C  02/08/18 0142

## 2018-02-08 NOTE — ED NOTES
"Rounding on the patient has been done. she has been updated on the plan of care and her current status. AAOx4. Pain was assessed and is currently a 0/10. Denies any pain or discomfort at this time. Denies any n/v; pt states nausea "comes and goes." States feeling "much better." Comfort positioning and restroom needs were addressed. Necessary items were placed with in her reach and she was advised when a reassessment would take place. The call bell remains at the bedside for any additional patient needs. The patient is resting comfortably on the stretcher, respirations are even and unlabored, skin warm and dry. Family member at BS. Will continue to monitor.   "

## 2018-02-08 NOTE — HPI
Patient is 42-year-old female no past medical history presents with complaints of sudden onset of sharp shooting abdominal pain approximately 2 hours prior to arrival.  Patient reports she was in her car with her wife headed to a restaurant to eat when she felt sudden onset of abdominal pain.  She reports symptoms lasted for approximately 10-15 minutes significantly improved to a dull ache.  Patient admits she thought it was hunger pain so she went into the restaurant and ate combo, fried fish and drank Sprite.  Upon returning back to the car after dinner she had sudden onset of the same pain that has now been constant.  She initially presented to an urgent care setting where she was referred to the emergency department for further evaluation because of her acute presentation of pain.  She reports associated nausea at that time with no vomiting.  She does admit however she started vomiting when she came into this emergency department.  She reports normal bowel movement today with no fever, chills, dysuria, hematuria, irregular vaginal bleeding, vaginal discharge, trauma or injury.  She is currently accompanied by her a flu is at bedside.

## 2018-02-08 NOTE — HOSPITAL COURSE
The patient had a CT Scan of the Abdomen and Pelvis done that was unremarkable.  Abdominal ultrasound was also unremarkable.  This was followed by a pelvic ultrasound that revealed 3 intramural fibroids otherwise no significant findings.  The patient responded well to pain control and was able to tolerate a diet prior to discharge.  This episode may represent a viral gastroenteritis and this was discussed with the patient.  She is instructed to follow up with her PCP to ensure complete resolution of her symptoms.

## 2018-02-08 NOTE — PLAN OF CARE
ATTN: TEAM DC PLANNING   OBSERVATION      NO NEEDS VOICED PER PATIENT INDEPENDENT     PT STATES HER PCP BEV T[facility]AR[facility] @ [facility]     Ana Mcrae RN  Case management 2/8/201812:32 PM  # [phone] (FAX) [phone]     02/08/18 1232   Discharge Assessment   Assessment Type Discharge Planning Assessment   Confirmed/corrected address and phone number on facesheet? Yes   Assessment information obtained from? Patient   Communicated expected length of stay with patient/caregiver yes   Prior to hospitilization cognitive status: Alert/Oriented   Prior to hospitalization functional status: Independent   Current cognitive status: Alert/Oriented   Current Functional Status: Independent   Able to Return to Prior Arrangements yes   Is patient able to care for self after discharge? Yes   Patient currently being followed by outpatient case management? No   Patient currently receives any other outside agency services? No   Equipment Currently Used at Home none   Do you have any problems affording any of your prescribed medications? TBD   Is the patient taking medications as prescribed? yes   Does the patient have transportation home? Yes   Does the patient receive services at the [facility]? No   Discharge Plan A Home   Discharge Plan B Home   Does the patient have transportation to healthcare appointments? Yes

## 2018-02-08 NOTE — ASSESSMENT & PLAN NOTE
CT- No CT evidence of acute appendicitis.  Wall thickening involving the large bowel.  The findings are suggestive of colitis.  Poor definition of the uterus with multiple uterine fibroids suspected.  Outpatient pelvic ultrasound and OB/GYN followup is suggested.  Nonspecific small amount of free fluid in the pelvis.    US- Heterogeneous appearance of the uterus with at least 3 intramural fibroids, noting that evaluation is limited in the absence of transvaginal imaging. Recommend correlation with clinical findings and continued followup as indicated    Cipro/Flagyl for colitis  Dilaudid for pain  Zofran for N/V

## 2018-02-08 NOTE — ED NOTES
PO INTAKE:  AAOx4. Pt given clear liquids po as per Dr. Wayne. Pt will be discharged to home if po intake tolerated per MD. Pt informed of plan of care. Pt states pain 1-2/10. States pain is tolerable. Denies any other discomfort at this time. Family member at BS. Skin is warm and dry. Resp:18 even and unlabored. Bed locked in low position, side rails up x2 and call light within reach.

## 2018-02-08 NOTE — ED NOTES
AAOx4. Resp:16 even and unlabored. Skin is warm and dry. Denies any pain, n/v or any discomfort upon discharge. Discharge instructions and Rx's given and reviewed with patient. Pt verbalized understanding. Valuables and belongings with patient. Family member at  states he's driving pt home. Pt ambulated to discharge window with steady gait noted. Pt discharge to home.

## 2018-02-08 NOTE — ED NOTES
PO INTAKE: AAOx4. Sitting up in the bed. Pt tolerating po fluids without any complaints of n/v. Will continue to monitor.

## 2018-02-08 NOTE — ED NOTES
ROUNDING:  Lying on stretcher; AAOx4. Pt smiling. Resp:16 even and unlabored. Skin is warm and dry. States abd pain 2/10. Pt states pain is tolerable. Denies any other discomfort at this time. Comfort and BR needs addressed. Plan of care updated with patient. Family member at BS. Bed locked in low position, side rails up x2 and call light within reach. Will continue to monitor.

## 2018-02-08 NOTE — ED NOTES
Rounding on the patient has been done. she has been updated on the plan of care and her current status. Lying on stretcher; AAOx4. Pt conversing with family member at BS. Pain was assessed and is currently a 3/10 abd pain. States pain is tolerable. Denies any other discomfort at this time. Comfort positioning and restroom needs were addressed. Necessary items were placed with in her reach and she was advised when a reassessment would take place. The call bell remains at the bedside for any additional patient needs. The patient is resting comfortably on the stretcher, respirations are even and unlabored, skin warm and dry. Will continue to monitor.

## 2018-02-08 NOTE — ED NOTES
Dr. Doss called and informed of patient passing po challenge.  Dr. Doss states patient's prescriptions were tubed down to ED and patient can be discharged.

## 2018-02-08 NOTE — ED TRIAGE NOTES
Patient presents to ED with c/o generalized abdominal pain that started at 1700 this evening. Pt reports pain resolved and came back after eating fried fish. Pt c/o 1 episode of vomiting, also reports some constipation, LBM today. Pt denies medical history. Pt AAO x4.

## 2018-02-08 NOTE — ED NOTES
Rounding on the patient has been done. she has been updated on the plan of care and her current status. Lying on stretcher; AAOx4. Pain was assessed and is currently a 2/10. States her pain is tolerable. Comfort positioning and restroom needs were addressed. Necessary items were placed with in her reach and she was advised when a reassessment would take place. The call bell remains at the bedside for any additional patient needs. The patient is resting comfortably on the stretcher, respirations are even and unlabored, skin warm and dry. Family at BS. Will continue to monitor.

## 2018-02-08 NOTE — ED NOTES
NPO:   NPO status maintained and reinforced. Pt verbalize understanding. Will continue to monitor.

## 2018-02-08 NOTE — ED NOTES
Patient and wife updated on plan of care. Pt resting on stretcher, side rails up x2, bed locked in lowest position. Respirations even and unlabored. Call light within reach, will continue to monitor.

## 2018-02-08 NOTE — DISCHARGE INSTRUCTIONS

## 2018-02-08 NOTE — ED NOTES
"ROUNDING: Lying on stretcher; AAOx4. States abd pain 1-2/10 "comes and goes." Denies any other discomfort at this time. Skin is warm and dry. Resp:18 even and unlabored. Comfort and BR needs addressed. Plan of care updated. Pt informed full liquid diet tray has been ordered and NPO status has been discontinued. Pt verbalized understanding. Bed locked in low position, side rails up x2 and call light within reach. Will continue to monitor.   "

## 2018-02-09 LAB — BACTERIA UR CULT: NO GROWTH

## 2019-03-01 ENCOUNTER — HOSPITAL ENCOUNTER (OUTPATIENT)
Dept: RADIOLOGY | Facility: OTHER | Age: 44
Discharge: HOME | End: 2019-03-01
Attending: OBSTETRICS & GYNECOLOGY
Payer: COMMERCIAL

## 2019-03-01 DIAGNOSIS — Z12.31 VISIT FOR SCREENING MAMMOGRAM: ICD-10-CM

## 2019-03-01 PROCEDURE — 77067 MAMMO DIGITAL SCREENING BILAT WITH TOMOSYNTHESIS_CAD: ICD-10-PCS | Mod: 26,,, | Performed by: RADIOLOGY

## 2019-03-01 PROCEDURE — 77067 SCR MAMMO BI INCL CAD: CPT | Mod: 26,,, | Performed by: RADIOLOGY

## 2019-03-01 PROCEDURE — 77067 SCR MAMMO BI INCL CAD: CPT | Mod: TC

## 2019-03-01 PROCEDURE — 77063 BREAST TOMOSYNTHESIS BI: CPT | Mod: 26,,, | Performed by: RADIOLOGY

## 2019-03-01 PROCEDURE — 77063 MAMMO DIGITAL SCREENING BILAT WITH TOMOSYNTHESIS_CAD: ICD-10-PCS | Mod: 26,,, | Performed by: RADIOLOGY

## 2020-01-24 DIAGNOSIS — Z12.31 SCREENING MAMMOGRAM FOR HIGH-RISK PATIENT: Primary | ICD-10-CM

## 2020-03-03 ENCOUNTER — OFFICE VISIT (OUTPATIENT)
Dept: PRIMARY CARE CLINIC | Facility: CLINIC | Age: 45
End: 2020-03-03
Attending: FAMILY MEDICINE
Payer: COMMERCIAL

## 2020-03-03 ENCOUNTER — LAB VISIT (OUTPATIENT)
Dept: LAB | Facility: HOSPITAL | Age: 45
End: 2020-03-03
Attending: FAMILY MEDICINE
Payer: COMMERCIAL

## 2020-03-03 VITALS
SYSTOLIC BLOOD PRESSURE: 120 MMHG | OXYGEN SATURATION: 100 % | WEIGHT: 107.06 LBS | BODY MASS INDEX: 22.47 KG/M2 | HEIGHT: 58 IN | HEART RATE: 72 BPM | DIASTOLIC BLOOD PRESSURE: 80 MMHG

## 2020-03-03 DIAGNOSIS — Z00.00 ANNUAL PHYSICAL EXAM: Primary | ICD-10-CM

## 2020-03-03 DIAGNOSIS — R19.8 ALTERNATING CONSTIPATION AND DIARRHEA: ICD-10-CM

## 2020-03-03 DIAGNOSIS — Z00.00 ANNUAL PHYSICAL EXAM: ICD-10-CM

## 2020-03-03 LAB
ALBUMIN SERPL BCP-MCNC: 4.2 G/DL (ref 3.5–5.2)
ALP SERPL-CCNC: 62 U/L (ref 55–135)
ALT SERPL W/O P-5'-P-CCNC: 28 U/L (ref 10–44)
ANION GAP SERPL CALC-SCNC: 9 MMOL/L (ref 8–16)
AST SERPL-CCNC: 21 U/L (ref 10–40)
BASOPHILS # BLD AUTO: 0.02 K/UL (ref 0–0.2)
BASOPHILS NFR BLD: 0.5 % (ref 0–1.9)
BILIRUB SERPL-MCNC: 0.5 MG/DL (ref 0.1–1)
BUN SERPL-MCNC: 10 MG/DL (ref 6–20)
CALCIUM SERPL-MCNC: 9.5 MG/DL (ref 8.7–10.5)
CHLORIDE SERPL-SCNC: 107 MMOL/L (ref 95–110)
CHOLEST SERPL-MCNC: 198 MG/DL (ref 120–199)
CHOLEST/HDLC SERPL: 2.3 {RATIO} (ref 2–5)
CO2 SERPL-SCNC: 24 MMOL/L (ref 23–29)
CREAT SERPL-MCNC: 0.9 MG/DL (ref 0.5–1.4)
DIFFERENTIAL METHOD: ABNORMAL
EOSINOPHIL # BLD AUTO: 0.1 K/UL (ref 0–0.5)
EOSINOPHIL NFR BLD: 1.4 % (ref 0–8)
ERYTHROCYTE [DISTWIDTH] IN BLOOD BY AUTOMATED COUNT: 13.3 % (ref 11.5–14.5)
EST. GFR  (AFRICAN AMERICAN): >60 ML/MIN/1.73 M^2
EST. GFR  (NON AFRICAN AMERICAN): >60 ML/MIN/1.73 M^2
ESTIMATED AVG GLUCOSE: 105 MG/DL (ref 68–131)
GLUCOSE SERPL-MCNC: 73 MG/DL (ref 70–110)
HBA1C MFR BLD HPLC: 5.3 % (ref 4–5.6)
HCT VFR BLD AUTO: 40.1 % (ref 37–48.5)
HDLC SERPL-MCNC: 88 MG/DL (ref 40–75)
HDLC SERPL: 44.4 % (ref 20–50)
HGB BLD-MCNC: 12.4 G/DL (ref 12–16)
IGA SERPL-MCNC: 191 MG/DL (ref 40–350)
IMM GRANULOCYTES # BLD AUTO: 0.01 K/UL (ref 0–0.04)
IMM GRANULOCYTES NFR BLD AUTO: 0.2 % (ref 0–0.5)
LDLC SERPL CALC-MCNC: 104 MG/DL (ref 63–159)
LIPASE SERPL-CCNC: 35 U/L (ref 4–60)
LYMPHOCYTES # BLD AUTO: 1.4 K/UL (ref 1–4.8)
LYMPHOCYTES NFR BLD: 32.1 % (ref 18–48)
MCH RBC QN AUTO: 31.1 PG (ref 27–31)
MCHC RBC AUTO-ENTMCNC: 30.9 G/DL (ref 32–36)
MCV RBC AUTO: 101 FL (ref 82–98)
MONOCYTES # BLD AUTO: 0.3 K/UL (ref 0.3–1)
MONOCYTES NFR BLD: 6.9 % (ref 4–15)
NEUTROPHILS # BLD AUTO: 2.6 K/UL (ref 1.8–7.7)
NEUTROPHILS NFR BLD: 58.9 % (ref 38–73)
NONHDLC SERPL-MCNC: 110 MG/DL
NRBC BLD-RTO: 0 /100 WBC
PLATELET # BLD AUTO: 246 K/UL (ref 150–350)
PMV BLD AUTO: 11.3 FL (ref 9.2–12.9)
POTASSIUM SERPL-SCNC: 4.2 MMOL/L (ref 3.5–5.1)
PROT SERPL-MCNC: 7.6 G/DL (ref 6–8.4)
RBC # BLD AUTO: 3.99 M/UL (ref 4–5.4)
SODIUM SERPL-SCNC: 140 MMOL/L (ref 136–145)
T4 FREE SERPL-MCNC: 0.91 NG/DL (ref 0.71–1.51)
TRIGL SERPL-MCNC: 30 MG/DL (ref 30–150)
TSH SERPL DL<=0.005 MIU/L-ACNC: 0.52 UIU/ML (ref 0.4–4)
WBC # BLD AUTO: 4.36 K/UL (ref 3.9–12.7)

## 2020-03-03 PROCEDURE — 83516 IMMUNOASSAY NONANTIBODY: CPT

## 2020-03-03 PROCEDURE — 83690 ASSAY OF LIPASE: CPT

## 2020-03-03 PROCEDURE — 84439 ASSAY OF FREE THYROXINE: CPT

## 2020-03-03 PROCEDURE — 99386 PREV VISIT NEW AGE 40-64: CPT | Mod: S$GLB,,, | Performed by: FAMILY MEDICINE

## 2020-03-03 PROCEDURE — 99999 PR PBB SHADOW E&M-EST. PATIENT-LVL III: ICD-10-PCS | Mod: PBBFAC,,, | Performed by: FAMILY MEDICINE

## 2020-03-03 PROCEDURE — 83036 HEMOGLOBIN GLYCOSYLATED A1C: CPT

## 2020-03-03 PROCEDURE — 85025 COMPLETE CBC W/AUTO DIFF WBC: CPT

## 2020-03-03 PROCEDURE — 80061 LIPID PANEL: CPT

## 2020-03-03 PROCEDURE — 99999 PR PBB SHADOW E&M-EST. PATIENT-LVL III: CPT | Mod: PBBFAC,,, | Performed by: FAMILY MEDICINE

## 2020-03-03 PROCEDURE — 84443 ASSAY THYROID STIM HORMONE: CPT

## 2020-03-03 PROCEDURE — 80053 COMPREHEN METABOLIC PANEL: CPT

## 2020-03-03 PROCEDURE — 99386 PR PREVENTIVE VISIT,NEW,40-64: ICD-10-PCS | Mod: S$GLB,,, | Performed by: FAMILY MEDICINE

## 2020-03-03 PROCEDURE — 82784 ASSAY IGA/IGD/IGG/IGM EACH: CPT

## 2020-03-03 PROCEDURE — 36415 COLL VENOUS BLD VENIPUNCTURE: CPT | Mod: PN

## 2020-03-03 RX ORDER — PANTOPRAZOLE SODIUM 40 MG/1
40 TABLET, DELAYED RELEASE ORAL DAILY
Qty: 30 TABLET | Refills: 0 | Status: SHIPPED | OUTPATIENT
Start: 2020-03-03 | End: 2021-03-03

## 2020-03-03 RX ORDER — DICYCLOMINE HYDROCHLORIDE 10 MG/1
10 CAPSULE ORAL DAILY PRN
Qty: 30 CAPSULE | Refills: 0 | Status: SHIPPED | OUTPATIENT
Start: 2020-03-03 | End: 2020-04-02

## 2020-03-03 NOTE — PROGRESS NOTES
"Subjective:      Patient ID: Maribell Starks is a 44 y.o. female.    Chief Complaint: Establish Care    HPI   Patient here today for annual exam. She exercises 1-2 days a week and is on the elliptical and weights. She has history of colitis and has cramping pain in the lower abdomen. She is unsure of food triggers. She has diarrhea sometimes with it. She has two episodes in the last two years. She has a bowel movement two times a day and the bowel movement can be soft or hard. No pain with urination or blood in urine. She reports her mood is overall happy, anxiety level is controlled.     Breakfast   Skip     Lunch   Subway sandwiches   Chicken sandwich/potato  popeyes     Dinner   Protein/veggies/starch     Beverages  Water   Sprite       Review of Systems   Constitutional: Negative for activity change, appetite change, chills, diaphoresis, fatigue, fever and unexpected weight change.   HENT: Negative for congestion, ear discharge, ear pain, hearing loss, postnasal drip, rhinorrhea, sinus pressure and sore throat.    Respiratory: Negative for cough, shortness of breath and wheezing.    Cardiovascular: Negative for chest pain.   Gastrointestinal: Negative for abdominal pain, constipation, diarrhea, nausea and vomiting.   Genitourinary: Negative for dysuria and frequency.   Musculoskeletal: Negative.    Psychiatric/Behavioral: Negative for suicidal ideas.     I personally reviewed Past Medical History, Past Surgical history,  Past Social History and Family History      Objective:   /80   Pulse 72   Ht 4' 10" (1.473 m)   Wt 48.5 kg (107 lb 0.5 oz)   SpO2 100%   BMI 22.37 kg/m²     Physical Exam   Constitutional: She is oriented to person, place, and time. She appears well-developed and well-nourished. No distress.   HENT:   Head: Normocephalic and atraumatic.   Right Ear: Hearing, tympanic membrane, external ear and ear canal normal.   Left Ear: Hearing, tympanic membrane, external ear and ear canal normal. "   Nose: Nose normal.   Mouth/Throat: Uvula is midline and oropharynx is clear and moist. No oropharyngeal exudate.   Eyes: Pupils are equal, round, and reactive to light. Conjunctivae and EOM are normal. Right eye exhibits no discharge. Left eye exhibits no discharge. No scleral icterus.   Neck: Normal range of motion. Neck supple.   Cardiovascular: Normal rate, regular rhythm, normal heart sounds and intact distal pulses. Exam reveals no gallop.   No murmur heard.  Pulmonary/Chest: Effort normal and breath sounds normal. No respiratory distress. She has no wheezes. She has no rales. She exhibits no tenderness.   Abdominal: Soft. Bowel sounds are normal. She exhibits no distension and no mass. There is no tenderness. There is no rebound and no guarding.   Neurological: She is alert and oriented to person, place, and time.   Skin: Skin is warm and dry.   Vitals reviewed.      1. Annual physical exam    2. Alternating constipation and diarrhea        1. Labs   2. Start food journal, reviewed stopping fast food, start PPI for two weeks, cut out sprite and only drink water   -stop aspirin and avoid all nsaids   -has completed colonoscopy at [facility] that was normal     Orders Placed This Encounter   Procedures    CBC auto differential    Comprehensive metabolic panel    Lipid panel    Hemoglobin A1c    Lipase    TSH    T4, free    Tissue transglutaminase, IgA    IgA     Medications Ordered This Encounter   Medications    dicyclomine (BENTYL) 10 MG capsule     Sig: Take 1 capsule (10 mg total) by mouth daily as needed (stomach cramping).     Dispense:  30 capsule     Refill:  0    pantoprazole (PROTONIX) 40 MG tablet     Sig: Take 1 tablet (40 mg total) by mouth once daily.     Dispense:  30 tablet     Refill:  0    peppermint oil (IBGARD) 90 mg CECX     Sig: Take 1 capsule by mouth every meal as needed.     Dispense:  90 each     Refill:  3

## 2020-03-03 NOTE — PATIENT INSTRUCTIONS
50-60 fl oz water daily       Gastritis or Ulcer (No Antibiotic Treatment)    Gastritis is irritation and inflammation of the stomach lining. This means the lining is red and swollen. An ulcer is an open sore in the lining of the stomach. It may also occur in the duodenum (first part of the small intestine). The causes and symptoms of gastritis and ulcers are very similar.  Causes and risk factors for both problems can include:  · Long-term use of nonsteroidal anti-inflammatory drugs (NSAIDs), such as aspirin and ibuprofen  · H. pylori bacteria infection  · Tobacco use  · Alcohol use  Symptoms for both problems can include:  · Dull or burning pain in the upper part of the belly  · Loss of appetite  · Heartburn or upset stomach  · Frequent burping  · Bloated feeling  · Nausea with or without vomiting  You likely had an evaluation to help determine the exact cause and extent of your problem. This may have included a health history, exam, and certain tests.  Results showed that your problem is not due to H. pylori infection. For this reason, no antibiotics are needed as part of your treatment.  Whether your problem is found to be gastritis or an ulcer, you will still need to take other medicines, however. You will also need to follow instructions to help reduce stomach irritation so your stomach can heal.   Home care  · Take any medicines youre prescribed exactly as directed. Common medicines used to treat gastritis include:  ¨ Antacids. These help neutralize the normal acids in your stomach.  ¨ Proton pump inhibitors. These block your stomach from making any acid.  ¨ H2 blockers.These reduce the amount of acid your stomach makes.  ¨ Bismuth subsalicylate. This helps protect the lining of your stomach from acid.  · Avoid taking any NSAIDS during your treatment. If you take NSAID to help treat other health problems, tell your healthcare provider. He or she may need to adjust your medicine plan or change the  dosage.  · Dont use tobacco. Also dont drink alcohol. These products can increase the amount of acid your stomach makes. This can delay healing. It can also worsen symptoms.  Follow-up care  Follow up with your healthcare provider, or as advised. In some cases, further testing may be needed.  When to seek medical advice  Call your healthcare provider right away if any of these occur:  · Fever of 100.4°F (38°C) or higher or as directed by your provider  · Stomach pain that worsens or moves to the lower right part of abdomen  · Extreme fatigue  · Weakness or dizziness  · Continued weight loss  · Frequent vomiting, blood in your vomit, or coffee ground-like substance in your vomit  · Black, tarry, or bloody stools  Call 911  Call 911 right away if any of these occur:  · Chest pain appears or worsens, or spreads to the back, neck, shoulder, or arm  · Unusually fast heart rate  · Trouble breathing or swallowing  · Confusion  · Extreme drowsiness or trouble waking up  · Fainting  · Large amounts of blood present in vomit or stool  Date Last Reviewed: 6/16/2015  © 9640-7592 [employer]. [address] [place] [address], [place] [address]. All rights reserved. This information is not intended as a substitute for professional medical care. Always follow your healthcare professional's instructions.

## 2020-03-06 LAB — TTG IGA SER-ACNC: 4 UNITS

## 2020-03-26 ENCOUNTER — PATIENT MESSAGE (OUTPATIENT)
Dept: PRIMARY CARE CLINIC | Facility: CLINIC | Age: 45
End: 2020-03-26

## 2020-03-29 ENCOUNTER — NURSE TRIAGE (OUTPATIENT)
Dept: ADMINISTRATIVE | Facility: CLINIC | Age: 45
End: 2020-03-29

## 2020-03-29 NOTE — TELEPHONE ENCOUNTER
Pt c/o diarrhea, dry cough associated with SOB and extreme fatigue. Pt does not have thermometer at home so is not sure if she is running fever. Per triage protocol, pt can continue to treat at home. Pt also provided with [facility] care information.     Reason for Disposition   Cough with no complications    Additional Information   Negative: Bluish (or gray) lips or face   Negative: Severe difficulty breathing (e.g., struggling for each breath, speaks in single words)   Negative: Rapid onset of cough and has hives   Negative: Coughing started suddenly after medicine, an allergic food or bee sting   Negative: Difficulty breathing after exposure to flames, smoke, or fumes   Negative: Sounds like a life-threatening emergency to the triager   Negative: Previous asthma attacks and this feels like asthma attack   Negative: Chest pain present when not coughing   Negative: Difficulty breathing   Negative: Passed out (i.e., fainted, collapsed and was not responding)   Negative: Patient sounds very sick or weak to the triager   Negative: Coughed up > 1 tablespoon (15 ml) blood (Exception: blood-tinged sputum)   Negative: Fever > 103 F (39.4 C)   Negative: Fever > 101 F (38.3 C) and over 60 years of age   Negative: Fever > 100.0 F (37.8 C) and has diabetes mellitus or a weak immune system (e.g., HIV positive, cancer chemotherapy, organ transplant, splenectomy, chronic steroids)   Negative: Fever > 100.0 F (37.8 C) and bedridden (e.g., nursing home patient, stroke, chronic illness, recovering from surgery)   Negative: Increasing ankle swelling   Negative: Wheezing is present   Negative: SEVERE coughing spells (e.g., whooping sound after coughing, vomiting after coughing)   Negative: Coughing up hiren-colored (reddish-brown) or blood-tinged sputum   Negative: Fever present > 3 days (72 hours)   Negative: Fever returns after gone for over 24 hours and symptoms worse or not improved   Negative:  Using nasal washes and pain medicine > 24 hours and sinus pain persists   Negative: Known COPD or other severe lung disease (i.e., bronchiectasis, cystic fibrosis, lung surgery) and worsening symptoms (i.e., increased sputum purulence or amount, increased breathing difficulty)   Negative: Continuous (nonstop) coughing interferes with work or school and no improvement using cough treatment per Care Advice   Negative: Patient wants to be seen   Negative: Cough has been present for > 3 weeks   Negative: Allergy symptoms are also present (e.g., itchy eyes, clear nasal discharge, postnasal drip)   Negative: Nasal discharge present > 10 days   Negative: Exposure to TB (Tuberculosis)   Negative: Taking an ACE Inhibitor medication (e.g., benazepril/LOTENSIN, captopril/CAPOTEN, enalapril/VASOTEC, lisinopril/ZESTRIL)    Protocols used: COUGH-A-OH

## 2020-04-01 RX ORDER — LANOLIN ALCOHOL/MO/W.PET/CERES
400 CREAM (GRAM) TOPICAL NIGHTLY
Qty: 30 TABLET | Refills: 3 | Status: SHIPPED | OUTPATIENT
Start: 2020-04-01

## 2020-04-01 RX ORDER — KETOROLAC TROMETHAMINE 10 MG/1
10 TABLET, FILM COATED ORAL EVERY 6 HOURS PRN
Qty: 20 TABLET | Refills: 1 | Status: SHIPPED | OUTPATIENT
Start: 2020-04-01

## 2020-04-07 ENCOUNTER — PATIENT MESSAGE (OUTPATIENT)
Dept: PRIMARY CARE CLINIC | Facility: CLINIC | Age: 45
End: 2020-04-07

## 2020-04-12 ENCOUNTER — NURSE TRIAGE (OUTPATIENT)
Dept: ADMINISTRATIVE | Facility: CLINIC | Age: 45
End: 2020-04-12

## 2020-04-13 NOTE — TELEPHONE ENCOUNTER
Patient reports she called two weeks ago with COVID-19 symptoms. The patient was inquiring about when she could go back to work and how long was she supposed to quarantine. Patient was advised per nursing judgment and was advised to call back with questions,concerns or symptoms. Patient verbalized understanding.     Reason for Disposition   Health Information question, no triage required and triager able to answer question    Additional Information   Negative: [1] Caller is not with the adult (patient) AND [2] reporting urgent symptoms   Negative: Lab result questions   Negative: Medication questions   Negative: Caller can't be reached by phone   Negative: Caller has already spoken to PCP or another triager   Negative: RN needs further essential information from caller in order to complete triage   Negative: Requesting regular office appointment   Negative: [1] Caller requesting NON-URGENT health information AND [2] PCP's office is the best resource    Protocols used: INFORMATION ONLY CALL-A-

## 2020-06-26 ENCOUNTER — HOSPITAL ENCOUNTER (OUTPATIENT)
Dept: RADIOLOGY | Facility: OTHER | Age: 45
Discharge: HOME | End: 2020-06-26
Attending: OBSTETRICS & GYNECOLOGY
Payer: COMMERCIAL

## 2020-06-26 DIAGNOSIS — Z12.31 SCREENING MAMMOGRAM FOR HIGH-RISK PATIENT: ICD-10-CM

## 2020-06-26 PROCEDURE — 77067 SCR MAMMO BI INCL CAD: CPT | Mod: 26,,, | Performed by: RADIOLOGY

## 2020-06-26 PROCEDURE — 77063 MAMMO DIGITAL SCREENING BILAT WITH TOMOSYNTHESIS_CAD: ICD-10-PCS | Mod: 26,,, | Performed by: RADIOLOGY

## 2020-06-26 PROCEDURE — 77067 MAMMO DIGITAL SCREENING BILAT WITH TOMOSYNTHESIS_CAD: ICD-10-PCS | Mod: 26,,, | Performed by: RADIOLOGY

## 2020-06-26 PROCEDURE — 77063 BREAST TOMOSYNTHESIS BI: CPT | Mod: 26,,, | Performed by: RADIOLOGY

## 2020-06-26 PROCEDURE — 77067 SCR MAMMO BI INCL CAD: CPT | Mod: TC

## 2020-11-01 ENCOUNTER — HOSPITAL ENCOUNTER (EMERGENCY)
Facility: OTHER | Age: 45
Discharge: HOME | End: 2020-11-01
Attending: EMERGENCY MEDICINE
Payer: COMMERCIAL

## 2020-11-01 VITALS
DIASTOLIC BLOOD PRESSURE: 58 MMHG | TEMPERATURE: 99 F | HEART RATE: 72 BPM | BODY MASS INDEX: 20.99 KG/M2 | OXYGEN SATURATION: 100 % | RESPIRATION RATE: 16 BRPM | WEIGHT: 100 LBS | SYSTOLIC BLOOD PRESSURE: 109 MMHG | HEIGHT: 58 IN

## 2020-11-01 DIAGNOSIS — M25.532 LEFT WRIST PAIN: Primary | ICD-10-CM

## 2020-11-01 LAB
B-HCG UR QL: NEGATIVE
CTP QC/QA: YES

## 2020-11-01 PROCEDURE — 63600175 PHARM REV CODE 636 W HCPCS: Performed by: PHYSICIAN ASSISTANT

## 2020-11-01 PROCEDURE — 96372 THER/PROPH/DIAG INJ SC/IM: CPT

## 2020-11-01 PROCEDURE — 99284 EMERGENCY DEPT VISIT MOD MDM: CPT | Mod: 25

## 2020-11-01 PROCEDURE — 81025 URINE PREGNANCY TEST: CPT | Performed by: EMERGENCY MEDICINE

## 2020-11-01 RX ORDER — KETOROLAC TROMETHAMINE 30 MG/ML
30 INJECTION, SOLUTION INTRAMUSCULAR; INTRAVENOUS
Status: COMPLETED | OUTPATIENT
Start: 2020-11-01 | End: 2020-11-01

## 2020-11-01 RX ORDER — METHYLPREDNISOLONE 4 MG/1
TABLET ORAL
Qty: 1 PACKAGE | Refills: 0 | Status: SHIPPED | OUTPATIENT
Start: 2020-11-01 | End: 2020-11-22

## 2020-11-01 RX ADMIN — KETOROLAC TROMETHAMINE 30 MG: 30 INJECTION, SOLUTION INTRAMUSCULAR; INTRAVENOUS at 04:11

## 2020-11-01 NOTE — ED TRIAGE NOTES
Pt to ed c/o L wrist pain that has been constant for the last few days. She states that she did not hit it on anything. She denies any current n/v/d, fever nor chills. Pt AAOx4, resp pattern even and non labored.

## 2020-11-01 NOTE — Clinical Note
"Maribell Rodney" Raudel was seen and treated in our emergency department on 11/1/2020.  She may return to work on 11/05/2020.       If you have any questions or concerns, please don't hesitate to call.      Joycelyn Salas PA-C"

## 2020-11-03 DIAGNOSIS — R52 PAIN: Primary | ICD-10-CM

## 2020-11-04 ENCOUNTER — TELEPHONE (OUTPATIENT)
Dept: ORTHOPEDICS | Facility: CLINIC | Age: 45
End: 2020-11-04

## 2020-11-05 ENCOUNTER — OFFICE VISIT (OUTPATIENT)
Dept: ORTHOPEDICS | Facility: CLINIC | Age: 45
End: 2020-11-05
Payer: COMMERCIAL

## 2020-11-05 ENCOUNTER — HOSPITAL ENCOUNTER (OUTPATIENT)
Dept: RADIOLOGY | Facility: OTHER | Age: 45
Discharge: HOME | End: 2020-11-05
Attending: PHYSICIAN ASSISTANT
Payer: COMMERCIAL

## 2020-11-05 VITALS
SYSTOLIC BLOOD PRESSURE: 101 MMHG | WEIGHT: 100 LBS | HEART RATE: 81 BPM | DIASTOLIC BLOOD PRESSURE: 69 MMHG | HEIGHT: 58 IN | BODY MASS INDEX: 20.99 KG/M2

## 2020-11-05 DIAGNOSIS — R52 PAIN: ICD-10-CM

## 2020-11-05 DIAGNOSIS — R73.03 PRE-DIABETES: ICD-10-CM

## 2020-11-05 DIAGNOSIS — M25.532 WRIST PAIN, ACUTE, LEFT: Primary | ICD-10-CM

## 2020-11-05 PROCEDURE — 3008F PR BODY MASS INDEX (BMI) DOCUMENTED: ICD-10-PCS | Mod: CPTII,S$GLB,, | Performed by: PHYSICIAN ASSISTANT

## 2020-11-05 PROCEDURE — 73110 XR WRIST COMPLETE 3 VIEWS LEFT: ICD-10-PCS | Mod: 26,LT,, | Performed by: RADIOLOGY

## 2020-11-05 PROCEDURE — 99203 PR OFFICE/OUTPT VISIT, NEW, LEVL III, 30-44 MIN: ICD-10-PCS | Mod: S$GLB,,, | Performed by: PHYSICIAN ASSISTANT

## 2020-11-05 PROCEDURE — 99203 OFFICE O/P NEW LOW 30 MIN: CPT | Mod: S$GLB,,, | Performed by: PHYSICIAN ASSISTANT

## 2020-11-05 PROCEDURE — 99999 PR PBB SHADOW E&M-EST. PATIENT-LVL III: CPT | Mod: PBBFAC,,, | Performed by: PHYSICIAN ASSISTANT

## 2020-11-05 PROCEDURE — 99999 PR PBB SHADOW E&M-EST. PATIENT-LVL III: ICD-10-PCS | Mod: PBBFAC,,, | Performed by: PHYSICIAN ASSISTANT

## 2020-11-05 PROCEDURE — 73110 X-RAY EXAM OF WRIST: CPT | Mod: TC,FY,LT

## 2020-11-05 PROCEDURE — 3008F BODY MASS INDEX DOCD: CPT | Mod: CPTII,S$GLB,, | Performed by: PHYSICIAN ASSISTANT

## 2020-11-05 PROCEDURE — 73110 X-RAY EXAM OF WRIST: CPT | Mod: 26,LT,, | Performed by: RADIOLOGY

## 2020-11-05 NOTE — PATIENT INSTRUCTIONS
Eating to Prevent Gout  Gout is a painful form of arthritis caused by an excess of uric acid. This is a waste product made by the body. It builds up in the body and forms crystals that collect in the joints, bringing on a gout attack. Alcohol and certain foods can trigger a gout attack. Below are some guidelines for changing your diet to help you manage gout. Your healthcare provider can work with you to determine the best eating plan for you. Know that diet is only one part of managing gout. Take your medicines as prescribed and follow the other guidelines your healthcare provider has given you.  Foods to limit  Eating too many foods containing purines may increase the levels of uric acid in your body and increase your risk for a gout attack. It may be best to limit these high-purine foods:  · Alcohol (beer, red wine). You may be told to avoid alcohol completely.  · Certain fish (anchovies, sardines, fish roes, herring, tuna, mussels, codfish, scallops, trout, and johnny)  · Certain meats (red meat, processed meat, khan, turkey, wild game, and goose)  · Sauces and gravies made with meat  · Organ meats (such as liver, kidneys, sweetbreads, and tripe)  · Legumes (such as dried beans, peas)  · Mushrooms, spinach, asparagus, and cauliflower  · Yeast and yeast extract supplements  Foods to try  Some foods may be helpful for people with gout. You may want to try adding some of the following foods to your diet:  · Dark berries: These include blueberries, blackberries, and cherries. These berries contain chemicals that may lower uric acid.  · Tofu: Tofu, which is made from soy, is a good source of protein. Studies have shown that it may be a better choice than meat for people with gout.  · Omega fatty acids: These acids are found in fatty fish (such as salmon), certain oils (such as flax, olive, or nut oils), or nuts. They may help prevent inflammation due to gout.  The following guidelines are recommended by the  American Medical Association for people with gout. Your diet should be:  · High in fiber, whole grains, fruits, and vegetables.  · Low in protein (15% of calories should come from protein. Choose lean sources such as soy, lean meats, and poultry).  · Low in fat (no more than 30% of calories should come from fat, with only 10% coming from animal fat).   Date Last Reviewed: 6/17/2015  © 3569-5463 [employer]. [address] [place] [address], [place] [address]. All rights reserved. This information is not intended as a substitute for professional medical care. Always follow your healthcare professional's instructions.

## 2020-11-05 NOTE — PROGRESS NOTES
Subjective:      Patient ID: Maribell Starks is a 45 y.o. female.    Chief Complaint: Pain of the Left Wrist      HPI  Maribell Starks is a right hand dominant 45 y.o. female presenting today for follow-up from the ED.  There was not a history of trauma.  Onset of symptoms began suddenly 5 days ago.  She went to the ED on 11/1/2020, was evaluated for acute wrist pain, redness, and swelling.  She was started on a steroid dosepak and reports that her pain and swelling are much improved.  She reports that she now has good range of motion without pain.  Of note, she reports that the went to urgent care 2 weeks ago for pain in the radial right wrist that she was told was from texting too much.  She reports that that improved with use of a oral anti-inflammatory.  She denies any personal history of gout.  She does report a family history of gout in her paternal grandfather.  She also reports that she is prediabetic.      Review of patient's allergies indicates:   Allergen Reactions    Penicillins Other (See Comments)     Unsure, mom has told her she was allergic         Current Outpatient Medications   Medication Sig Dispense Refill    methylPREDNISolone (MEDROL DOSEPACK) 4 mg tablet Take as packaging instructs 1 Package 0    aspirin (ECOTRIN) 81 MG EC tablet Take 81 mg by mouth once daily.      hydrocodone-acetaminophen 7.5-325mg (NORCO) 7.5-325 mg per tablet Take 1 tablet by mouth every 6 (six) hours as needed for Pain. (Patient not taking: Reported on 3/3/2020) 15 tablet 0    ketorolac (TORADOL) 10 mg tablet Take 1 tablet (10 mg total) by mouth every 6 (six) hours as needed for Pain (headache). 20 tablet 1    magnesium oxide (MAG-OX) 400 mg (241.3 mg magnesium) tablet Take 1 tablet (400 mg total) by mouth every evening. 30 tablet 3    pantoprazole (PROTONIX) 40 MG tablet Take 1 tablet (40 mg total) by mouth once daily. 30 tablet 0    peppermint oil (IBGARD) 90 mg CECX Take 1 capsule by mouth every meal as needed.  "90 each 3    promethazine (PHENERGAN) 12.5 MG Tab Take 1 tablet (12.5 mg total) by mouth every 6 (six) hours as needed. (Patient not taking: Reported on 3/3/2020) 15 tablet 0     No current facility-administered medications for this visit.        Past Medical History:   Diagnosis Date    Fibroids        Past Surgical History:   Procedure Laterality Date    COLONOSCOPY      TUBAL LIGATION           Review of Systems:  Review of Systems   Constitution: Negative for chills and fever.   Skin: Negative for rash and suspicious lesions.   Musculoskeletal:        See HPI   Neurological: Negative for dizziness, headaches, light-headedness, numbness and paresthesias.   Psychiatric/Behavioral: Negative for depression. The patient is not nervous/anxious.          OBJECTIVE:     PHYSICAL EXAM:  Height: 4' 10" (147.3 cm) Weight: 45.4 kg (100 lb)  Vitals:    11/05/20 1523   BP: 101/69   Pulse: 81   Weight: 45.4 kg (100 lb)   Height: 4' 10" (1.473 m)   PainSc:   4     General    Vitals reviewed.  Constitutional: She is oriented to person, place, and time. She appears well-developed and well-nourished.   HENT:   Head: Normocephalic and atraumatic.   Neck: Normal range of motion.   Cardiovascular: Normal rate.    Pulmonary/Chest: Effort normal. No respiratory distress.   Neurological: She is alert and oriented to person, place, and time.   Psychiatric: She has a normal mood and affect. Her behavior is normal. Judgment and thought content normal.             Musculoskeletal:  No edema or erythema appreciated.  No lacerations or abrasions, no scars.  She is mildly tender to palpation over the right distal radius/radiocarpal joint dorsally.  Good finger and wrist range of motion bilaterally.  Negative Finkelstein's bilaterally.  Neurovascularly intact-good sensation and motor function, good capillary refill, 2+ radial pulses.  Negative Tinel's bilaterally at the carpal tunnel.    RADIOGRAPHS:  Left Wrist Xray, " 11/5/2020  FINDINGS:  No displaced fracture or subluxation.  No bone erosion.  No prominent degenerative change.  Unremarkable soft tissues.     Impression:  No acute abnormality.    Comments: I have personally reviewed the imaging and I agree with the above radiologist's report.    ASSESSMENT/PLAN:   Maribell was seen today for pain.    Diagnoses and all orders for this visit:    Wrist pain, acute, left  -     URIC ACID; Future  -     Sedimentation rate; Future  -     C-reactive protein; Future  -     CBC W/ AUTO DIFFERENTIAL; Future    Pre-diabetes  -     Hemoglobin A1C; Future           - We talked at length about the anatomy and pathophysiology of   Encounter Diagnoses   Name Primary?    Wrist pain, acute, left Yes    Pre-diabetes        - discussed patient's symptoms and physical exam findings, discussed resolution of her symptoms.  She will complete her course of Medrol Dosepak as prescribed.  - labs ordered and scheduled, discussed that due to resolution of her symptoms labs may be normal.  Discussed that this could be related to a gout attack.  Patient information was provided on gout diet  - call with any questions or concerns, call if symptoms return  - follow-up as needed    Disclaimer: This note has been generated using voice-recognition software. There may be typographical errors that have been missed during proof-reading.

## 2020-11-06 ENCOUNTER — TELEPHONE (OUTPATIENT)
Dept: ORTHOPEDICS | Facility: CLINIC | Age: 45
End: 2020-11-06

## 2020-11-06 NOTE — TELEPHONE ENCOUNTER
Per Vannesa:  Spoke with pt informing her that her Uric Acid (gout) level was normal, so was her inflammatory marker.  Still waiting on 2 more labs.  Pt understood.

## 2021-04-26 ENCOUNTER — PATIENT MESSAGE (OUTPATIENT)
Dept: RESEARCH | Facility: HOSPITAL | Age: 46
End: 2021-04-26

## 2021-07-09 ENCOUNTER — HOSPITAL ENCOUNTER (OUTPATIENT)
Dept: RADIOLOGY | Facility: OTHER | Age: 46
Discharge: HOME | End: 2021-07-09
Attending: OBSTETRICS & GYNECOLOGY
Payer: COMMERCIAL

## 2021-07-09 DIAGNOSIS — Z12.31 VISIT FOR SCREENING MAMMOGRAM: ICD-10-CM

## 2021-07-09 PROCEDURE — 77067 SCR MAMMO BI INCL CAD: CPT | Mod: TC

## 2021-07-09 PROCEDURE — 77067 MAMMO DIGITAL SCREENING BILAT WITH TOMO: ICD-10-PCS | Mod: 26,,, | Performed by: RADIOLOGY

## 2021-07-09 PROCEDURE — 77063 MAMMO DIGITAL SCREENING BILAT WITH TOMO: ICD-10-PCS | Mod: 26,,, | Performed by: RADIOLOGY

## 2021-07-09 PROCEDURE — 77067 SCR MAMMO BI INCL CAD: CPT | Mod: 26,,, | Performed by: RADIOLOGY

## 2021-07-09 PROCEDURE — 77063 BREAST TOMOSYNTHESIS BI: CPT | Mod: 26,,, | Performed by: RADIOLOGY

## 2021-10-25 ENCOUNTER — OFFICE VISIT (OUTPATIENT)
Dept: OPTOMETRY | Facility: CLINIC | Age: 46
End: 2021-10-25
Payer: COMMERCIAL

## 2021-10-25 DIAGNOSIS — H52.13 MYOPIA WITH ASTIGMATISM AND PRESBYOPIA, BILATERAL: ICD-10-CM

## 2021-10-25 DIAGNOSIS — H04.123 DRY EYE SYNDROME OF BOTH EYES: ICD-10-CM

## 2021-10-25 DIAGNOSIS — H52.4 MYOPIA WITH ASTIGMATISM AND PRESBYOPIA, BILATERAL: ICD-10-CM

## 2021-10-25 DIAGNOSIS — G43.109 OCULAR MIGRAINE: Primary | ICD-10-CM

## 2021-10-25 DIAGNOSIS — H52.203 MYOPIA WITH ASTIGMATISM AND PRESBYOPIA, BILATERAL: ICD-10-CM

## 2021-10-25 DIAGNOSIS — H43.393 VISUAL FLOATERS, BILATERAL: ICD-10-CM

## 2021-10-25 PROCEDURE — 1160F RVW MEDS BY RX/DR IN RCRD: CPT | Mod: CPTII,S$GLB,, | Performed by: OPTOMETRIST

## 2021-10-25 PROCEDURE — 1159F PR MEDICATION LIST DOCUMENTED IN MEDICAL RECORD: ICD-10-PCS | Mod: CPTII,S$GLB,, | Performed by: OPTOMETRIST

## 2021-10-25 PROCEDURE — 92004 PR EYE EXAM, NEW PATIENT,COMPREHESV: ICD-10-PCS | Mod: S$GLB,,, | Performed by: OPTOMETRIST

## 2021-10-25 PROCEDURE — 92004 COMPRE OPH EXAM NEW PT 1/>: CPT | Mod: S$GLB,,, | Performed by: OPTOMETRIST

## 2021-10-25 PROCEDURE — 92015 DETERMINE REFRACTIVE STATE: CPT | Mod: S$GLB,,, | Performed by: OPTOMETRIST

## 2021-10-25 PROCEDURE — 1159F MED LIST DOCD IN RCRD: CPT | Mod: CPTII,S$GLB,, | Performed by: OPTOMETRIST

## 2021-10-25 PROCEDURE — 1160F PR REVIEW ALL MEDS BY PRESCRIBER/CLIN PHARMACIST DOCUMENTED: ICD-10-PCS | Mod: CPTII,S$GLB,, | Performed by: OPTOMETRIST

## 2021-10-25 PROCEDURE — 92015 PR REFRACTION: ICD-10-PCS | Mod: S$GLB,,, | Performed by: OPTOMETRIST

## 2021-10-25 PROCEDURE — 99999 PR PBB SHADOW E&M-EST. PATIENT-LVL II: CPT | Mod: PBBFAC,,, | Performed by: OPTOMETRIST

## 2021-10-25 PROCEDURE — 99999 PR PBB SHADOW E&M-EST. PATIENT-LVL II: ICD-10-PCS | Mod: PBBFAC,,, | Performed by: OPTOMETRIST

## 2022-01-01 NOTE — ED PROVIDER NOTES
Encounter Date: 11/1/2020       History     Chief Complaint   Patient presents with    Wrist Pain     L wrist pain since friday worsening. seen at urgent care and sent here to r/o septic joint       Patient is a 45 y.o. female presenting to the emergency department for evaluation of left wrist pain.  The patient states that she has had pain since Friday that has continued to worsen her left wrist.  She states that she tried taking 1 dose of Endo like last night with no improvement.  She states that she went to urgent care today for evaluation had a negative x-ray was told to come the emergency department for possible septic joint.  The patient denies any injury or trauma.  She admits a few months ago she had pain in her right wrist that did resolve after taking in July.  She denies any numbness, tingling or weakness.  She denies any prior episode of injury in this wrist.This is the extent of the patient's complaints at this time.       The history is provided by the patient.     Review of patient's allergies indicates:   Allergen Reactions    Penicillins Other (See Comments)     Unsure, mom has told her she was allergic     Past Medical History:   Diagnosis Date    Fibroids      Past Surgical History:   Procedure Laterality Date    COLONOSCOPY      TUBAL LIGATION       Family History   Problem Relation Age of Onset    Breast cancer Paternal Aunt         post-menopausal    Stroke Maternal Grandmother     Cancer Paternal Grandmother     Cancer Paternal Grandfather     Osteoporosis Mother      Social History     Tobacco Use    Smoking status: Never Smoker    Smokeless tobacco: Never Used   Substance Use Topics    Alcohol use: Yes     Comment: socially    Drug use: No     Review of Systems   Constitutional: Negative for activity change, appetite change, chills, fatigue and fever.   HENT: Negative for congestion, rhinorrhea and sore throat.    Eyes: Negative for photophobia and visual disturbance.    Respiratory: Negative for cough, shortness of breath and wheezing.    Cardiovascular: Negative for chest pain.   Gastrointestinal: Negative for abdominal pain, diarrhea, nausea and vomiting.   Genitourinary: Negative for dysuria, hematuria and urgency.   Musculoskeletal: Positive for arthralgias. Negative for back pain, myalgias and neck pain.   Skin: Negative for color change and wound.   Neurological: Negative for weakness and headaches.   Psychiatric/Behavioral: Negative for agitation and confusion.       Physical Exam     Initial Vitals [11/01/20 1412]   BP Pulse Resp Temp SpO2   (!) 135/98 92 18 98.7 °F (37.1 °C) 100 %      MAP       --         Physical Exam    Nursing note and vitals reviewed.  Constitutional: She appears well-developed and well-nourished. She is not diaphoretic.  Non-toxic appearance. She does not have a sickly appearance. She does not appear ill. No distress.   Well-appearing,  female accompanied the emergency.  Speaking clearly full sentences.  No acute distress.   HENT:   Head: Normocephalic and atraumatic.   Right Ear: External ear normal.   Left Ear: External ear normal.   Nose: Nose normal.   Mouth/Throat: Oropharynx is clear and moist.   Eyes: Conjunctivae and EOM are normal.   Neck: Normal range of motion. Neck supple.   Cardiovascular: Normal rate.   Pulmonary/Chest: No respiratory distress.   Musculoskeletal: Normal range of motion.      Comments: Tenderness to palpation of the left wrist along the dorsal aspect near the midline.  No tenderness to palpation of either side laterally.  Decreased range of motion secondary to pain.  No tenderness to palpation of the hand or and palpable snuffbox.   Neurological: She is alert and oriented to person, place, and time. GCS eye subscore is 4. GCS verbal subscore is 5. GCS motor subscore is 6.   Skin: Skin is warm.   Psychiatric: She has a normal mood and affect. Her behavior is normal. Judgment and thought content normal.          ED Course   Procedures  Labs Reviewed   POCT URINE PREGNANCY          Imaging Results    None          Medical Decision Making:   Initial Assessment:     Urgent evaluation a 45-year-old female presenting to the emergency department for left wrist pain.  Patient is afebrile nontoxic appearing, hemodynamically stable, neurovascularly intact.  Physical exam reveals tenderness to palpation of dorsal aspect left wrist.  No palpable deformity, crepitus step-off.  No history of trauma or injury.  I did review x-ray report from Urgent Care that was unremarkable.    Clinical Tests:   Lab Tests: Ordered and Reviewed  ED Management:    UPT is negative.  Given patient's history and physical exam, do not feel that repeat imaging is indicated, especially since today she has had some.  Signs symptoms are more likely consistent with tendinitis versus bursitis.  Patient is not have any signs or symptoms concerning for a septic joint.  She does have decreased range of motion but there is no signs of infection.  No edema.  Patient currently has NSAIDs, urged to continue taking it.  Also given Toradol in the ED as well the Medrol Dosepak.The patient was instructed to follow up with a primary care provider in 2 days or to return to the emergency department for worsening symptoms. The treatment plan was discussed with the patient who demonstrated understanding and comfort with plan. The patient's history, physical exam, and plan of care was discussed with and agreed upon with my supervising physician.       This note was created using [employer] Flu[employer] Direct. There may be typographical errors secondary to dictation.                                Clinical Impression:     ICD-10-CM ICD-9-CM   1. Left wrist pain  M25.532 719.43                          ED Disposition Condition    Discharge Stable        ED Prescriptions     Medication Sig Dispense Start Date End Date Auth. Provider    methylPREDNISolone (MEDROL DOSEPACK) 4 mg tablet  Take as packaging instructs 1 Package 11/1/2020 11/22/2020 Joycelyn Salas PA-C        Follow-up Information     Follow up With Specialties Details Why Contact Info    Jessica Lama MD Obstetrics, Obstetrics and Gynecology   [address]  [address]  [facility] [address]  [phone]      [facility]   [address] [facility] [id number] [phone]                                       Joycelyn Salas PA-C  11/01/20 1547     No

## 2022-07-15 ENCOUNTER — HOSPITAL ENCOUNTER (OUTPATIENT)
Dept: RADIOLOGY | Facility: OTHER | Age: 47
Discharge: HOME | End: 2022-07-15
Attending: OBSTETRICS & GYNECOLOGY
Payer: COMMERCIAL

## 2022-07-15 DIAGNOSIS — Z12.31 VISIT FOR SCREENING MAMMOGRAM: ICD-10-CM

## 2022-07-15 PROCEDURE — 77063 MAMMO DIGITAL SCREENING BILAT WITH TOMO: ICD-10-PCS | Mod: 26,,, | Performed by: RADIOLOGY

## 2022-07-15 PROCEDURE — 77063 BREAST TOMOSYNTHESIS BI: CPT | Mod: 26,,, | Performed by: RADIOLOGY

## 2022-07-15 PROCEDURE — 77067 SCR MAMMO BI INCL CAD: CPT | Mod: 26,,, | Performed by: RADIOLOGY

## 2022-07-15 PROCEDURE — 77067 MAMMO DIGITAL SCREENING BILAT WITH TOMO: ICD-10-PCS | Mod: 26,,, | Performed by: RADIOLOGY

## 2022-07-15 PROCEDURE — 77063 BREAST TOMOSYNTHESIS BI: CPT | Mod: TC

## 2023-03-13 ENCOUNTER — OFFICE VISIT (OUTPATIENT)
Dept: OPTOMETRY | Facility: CLINIC | Age: 48
End: 2023-03-13
Payer: COMMERCIAL

## 2023-03-13 DIAGNOSIS — H04.123 DRY EYE SYNDROME OF BOTH EYES: Primary | ICD-10-CM

## 2023-03-13 DIAGNOSIS — H52.4 MYOPIA WITH ASTIGMATISM AND PRESBYOPIA, BILATERAL: ICD-10-CM

## 2023-03-13 DIAGNOSIS — H52.203 MYOPIA WITH ASTIGMATISM AND PRESBYOPIA, BILATERAL: ICD-10-CM

## 2023-03-13 DIAGNOSIS — H52.13 MYOPIA WITH ASTIGMATISM AND PRESBYOPIA, BILATERAL: ICD-10-CM

## 2023-03-13 PROCEDURE — 1160F PR REVIEW ALL MEDS BY PRESCRIBER/CLIN PHARMACIST DOCUMENTED: ICD-10-PCS | Mod: CPTII,S$GLB,, | Performed by: OPTOMETRIST

## 2023-03-13 PROCEDURE — 99999 PR PBB SHADOW E&M-EST. PATIENT-LVL III: CPT | Mod: PBBFAC,,, | Performed by: OPTOMETRIST

## 2023-03-13 PROCEDURE — 1159F MED LIST DOCD IN RCRD: CPT | Mod: CPTII,S$GLB,, | Performed by: OPTOMETRIST

## 2023-03-13 PROCEDURE — 92015 PR REFRACTION: ICD-10-PCS | Mod: S$GLB,,, | Performed by: OPTOMETRIST

## 2023-03-13 PROCEDURE — 1160F RVW MEDS BY RX/DR IN RCRD: CPT | Mod: CPTII,S$GLB,, | Performed by: OPTOMETRIST

## 2023-03-13 PROCEDURE — 1159F PR MEDICATION LIST DOCUMENTED IN MEDICAL RECORD: ICD-10-PCS | Mod: CPTII,S$GLB,, | Performed by: OPTOMETRIST

## 2023-03-13 PROCEDURE — 99999 PR PBB SHADOW E&M-EST. PATIENT-LVL III: ICD-10-PCS | Mod: PBBFAC,,, | Performed by: OPTOMETRIST

## 2023-03-13 PROCEDURE — 92014 COMPRE OPH EXAM EST PT 1/>: CPT | Mod: S$GLB,,, | Performed by: OPTOMETRIST

## 2023-03-13 PROCEDURE — 92015 DETERMINE REFRACTIVE STATE: CPT | Mod: S$GLB,,, | Performed by: OPTOMETRIST

## 2023-03-13 PROCEDURE — 92014 PR EYE EXAM, EST PATIENT,COMPREHESV: ICD-10-PCS | Mod: S$GLB,,, | Performed by: OPTOMETRIST

## 2023-03-13 NOTE — PROGRESS NOTES
JENNIFFER    MARLEN: [birth year]  Chief complaint (CC): Patient is here for annual eye exam today. Patient   has noticed that she has to take her glasses off to see the computer and   to read.  Distance vision seems fine.  Patient has dry eyes off and on but   is not bothered by it.  Glasses? + 1 1/2 yrs. old  Contacts? -  H/o eye surgery, injections or laser: -  H/o eye injury: -  Known eye conditions? See above  Family h/o eye conditions? -  Eye gtts? -      (-) Flashes (-)  Floaters (-) Mucous   (+)  Tearing (-) Itching (+) Burning   (-) Headaches (-) Eye Pain/discomfort (+) Irritation   (-)  Redness (-) Double vision (-) Blurry vision    Diabetic? -  A1c? -      Last edited by Gabi Cochran on 3/13/2023  1:16 PM.            Assessment /Plan     For exam results, see Encounter Report.      Dry eye syndrome of both eyes  Recommend Systane Ultra or Refresh Optive BID-TID OU to aid with symptoms of dry eyes.    Myopia with astigmatism and presbyopia, bilateral  SRx released to patient. Patient educated on lens options. Normal ocular health. RTC 1 year for routine exam.

## 2023-07-20 ENCOUNTER — HOSPITAL ENCOUNTER (OUTPATIENT)
Dept: RADIOLOGY | Facility: OTHER | Age: 48
Discharge: HOME | End: 2023-07-20
Attending: OBSTETRICS & GYNECOLOGY
Payer: COMMERCIAL

## 2023-07-20 DIAGNOSIS — Z12.31 VISIT FOR SCREENING MAMMOGRAM: ICD-10-CM

## 2023-07-20 PROCEDURE — 77067 SCR MAMMO BI INCL CAD: CPT | Mod: 26,,, | Performed by: RADIOLOGY

## 2023-07-20 PROCEDURE — 77063 BREAST TOMOSYNTHESIS BI: CPT | Mod: 26,,, | Performed by: RADIOLOGY

## 2023-07-20 PROCEDURE — 77067 MAMMO DIGITAL SCREENING BILAT WITH TOMO: ICD-10-PCS | Mod: 26,,, | Performed by: RADIOLOGY

## 2023-07-20 PROCEDURE — 77063 MAMMO DIGITAL SCREENING BILAT WITH TOMO: ICD-10-PCS | Mod: 26,,, | Performed by: RADIOLOGY

## 2023-07-20 PROCEDURE — 77067 SCR MAMMO BI INCL CAD: CPT | Mod: TC

## 2024-09-27 ENCOUNTER — HOSPITAL ENCOUNTER (OUTPATIENT)
Dept: RADIOLOGY | Facility: OTHER | Age: 49
Discharge: HOME | End: 2024-09-27
Attending: OBSTETRICS & GYNECOLOGY
Payer: COMMERCIAL

## 2024-09-27 DIAGNOSIS — Z12.31 VISIT FOR SCREENING MAMMOGRAM: ICD-10-CM

## 2024-09-27 PROCEDURE — 77063 BREAST TOMOSYNTHESIS BI: CPT | Mod: 26,,, | Performed by: RADIOLOGY

## 2024-09-27 PROCEDURE — 77067 SCR MAMMO BI INCL CAD: CPT | Mod: 26,,, | Performed by: RADIOLOGY

## 2024-09-27 PROCEDURE — 77067 SCR MAMMO BI INCL CAD: CPT | Mod: TC
